# Patient Record
Sex: MALE | Race: WHITE | NOT HISPANIC OR LATINO | URBAN - METROPOLITAN AREA
[De-identification: names, ages, dates, MRNs, and addresses within clinical notes are randomized per-mention and may not be internally consistent; named-entity substitution may affect disease eponyms.]

---

## 2018-07-03 ENCOUNTER — EMERGENCY (EMERGENCY)
Facility: HOSPITAL | Age: 38
LOS: 1 days | End: 2018-07-03
Payer: MEDICAID

## 2018-07-03 PROCEDURE — 73130 X-RAY EXAM OF HAND: CPT | Mod: 26,RT

## 2018-07-03 PROCEDURE — 12002 RPR S/N/AX/GEN/TRNK2.6-7.5CM: CPT

## 2018-07-03 PROCEDURE — 99283 EMERGENCY DEPT VISIT LOW MDM: CPT | Mod: 25

## 2018-12-01 ENCOUNTER — EMERGENCY (EMERGENCY)
Facility: HOSPITAL | Age: 38
LOS: 1 days | End: 2018-12-01
Payer: OTHER GOVERNMENT

## 2018-12-01 PROCEDURE — 99283 EMERGENCY DEPT VISIT LOW MDM: CPT

## 2020-01-31 ENCOUNTER — OUTPATIENT (OUTPATIENT)
Dept: OUTPATIENT SERVICES | Facility: HOSPITAL | Age: 40
LOS: 1 days | End: 2020-01-31
Payer: OTHER GOVERNMENT

## 2020-01-31 PROCEDURE — 72146 MRI CHEST SPINE W/O DYE: CPT | Mod: 26

## 2020-01-31 PROCEDURE — 72148 MRI LUMBAR SPINE W/O DYE: CPT | Mod: 26

## 2022-10-14 ENCOUNTER — APPOINTMENT (OUTPATIENT)
Dept: RADIOLOGY | Facility: CLINIC | Age: 42
End: 2022-10-14

## 2022-10-14 PROCEDURE — 73564 X-RAY EXAM KNEE 4 OR MORE: CPT | Mod: LT

## 2023-06-21 ENCOUNTER — TRANSCRIPTION ENCOUNTER (OUTPATIENT)
Age: 43
End: 2023-06-21

## 2023-06-21 ENCOUNTER — INPATIENT (INPATIENT)
Facility: HOSPITAL | Age: 43
LOS: 1 days | Discharge: HOME | End: 2023-06-23
Attending: SPECIALIST | Admitting: SPECIALIST
Payer: MEDICAID

## 2023-06-21 VITALS
HEART RATE: 81 BPM | TEMPERATURE: 98 F | DIASTOLIC BLOOD PRESSURE: 97 MMHG | RESPIRATION RATE: 18 BRPM | SYSTOLIC BLOOD PRESSURE: 165 MMHG | OXYGEN SATURATION: 95 %

## 2023-06-21 DIAGNOSIS — N48.30 PRIAPISM, UNSPECIFIED: ICD-10-CM

## 2023-06-21 DIAGNOSIS — F11.90 OPIOID USE, UNSPECIFIED, UNCOMPLICATED: ICD-10-CM

## 2023-06-21 DIAGNOSIS — N48.39 OTHER PRIAPISM: ICD-10-CM

## 2023-06-21 PROBLEM — Z00.00 ENCOUNTER FOR PREVENTIVE HEALTH EXAMINATION: Status: ACTIVE | Noted: 2023-06-21

## 2023-06-21 LAB
ANION GAP SERPL CALC-SCNC: 14 MMOL/L — SIGNIFICANT CHANGE UP (ref 7–14)
APTT BLD: 31 SEC — SIGNIFICANT CHANGE UP (ref 27–36.3)
BASOPHILS # BLD AUTO: 0.03 K/UL — SIGNIFICANT CHANGE UP (ref 0–0.2)
BASOPHILS NFR BLD AUTO: 0.3 % — SIGNIFICANT CHANGE UP (ref 0–2)
BLD GP AB SCN SERPL QL: NEGATIVE — SIGNIFICANT CHANGE UP
BUN SERPL-MCNC: 16 MG/DL — SIGNIFICANT CHANGE UP (ref 7–23)
CALCIUM SERPL-MCNC: 8.2 MG/DL — LOW (ref 8.4–10.5)
CHLORIDE SERPL-SCNC: 101 MMOL/L — SIGNIFICANT CHANGE UP (ref 98–107)
CO2 SERPL-SCNC: 24 MMOL/L — SIGNIFICANT CHANGE UP (ref 22–31)
CREAT SERPL-MCNC: 0.82 MG/DL — SIGNIFICANT CHANGE UP (ref 0.5–1.3)
EGFR: 112 ML/MIN/1.73M2 — SIGNIFICANT CHANGE UP
EOSINOPHIL # BLD AUTO: 0.2 K/UL — SIGNIFICANT CHANGE UP (ref 0–0.5)
EOSINOPHIL NFR BLD AUTO: 2.1 % — SIGNIFICANT CHANGE UP (ref 0–6)
GLUCOSE SERPL-MCNC: 117 MG/DL — HIGH (ref 70–99)
HCT VFR BLD CALC: 30.1 % — LOW (ref 39–50)
HGB BLD-MCNC: 10.1 G/DL — LOW (ref 13–17)
IANC: 5.02 K/UL — SIGNIFICANT CHANGE UP (ref 1.8–7.4)
IMM GRANULOCYTES NFR BLD AUTO: 0.3 % — SIGNIFICANT CHANGE UP (ref 0–0.9)
INR BLD: 1.06 RATIO — SIGNIFICANT CHANGE UP (ref 0.88–1.16)
LYMPHOCYTES # BLD AUTO: 3.24 K/UL — SIGNIFICANT CHANGE UP (ref 1–3.3)
LYMPHOCYTES # BLD AUTO: 34.2 % — SIGNIFICANT CHANGE UP (ref 13–44)
MCHC RBC-ENTMCNC: 28.1 PG — SIGNIFICANT CHANGE UP (ref 27–34)
MCHC RBC-ENTMCNC: 33.6 GM/DL — SIGNIFICANT CHANGE UP (ref 32–36)
MCV RBC AUTO: 83.8 FL — SIGNIFICANT CHANGE UP (ref 80–100)
MONOCYTES # BLD AUTO: 0.94 K/UL — HIGH (ref 0–0.9)
MONOCYTES NFR BLD AUTO: 9.9 % — SIGNIFICANT CHANGE UP (ref 2–14)
NEUTROPHILS # BLD AUTO: 5.02 K/UL — SIGNIFICANT CHANGE UP (ref 1.8–7.4)
NEUTROPHILS NFR BLD AUTO: 53.2 % — SIGNIFICANT CHANGE UP (ref 43–77)
NRBC # BLD: 0 /100 WBCS — SIGNIFICANT CHANGE UP (ref 0–0)
NRBC # FLD: 0 K/UL — SIGNIFICANT CHANGE UP (ref 0–0)
PLATELET # BLD AUTO: 218 K/UL — SIGNIFICANT CHANGE UP (ref 150–400)
POTASSIUM SERPL-MCNC: 4 MMOL/L — SIGNIFICANT CHANGE UP (ref 3.5–5.3)
POTASSIUM SERPL-SCNC: 4 MMOL/L — SIGNIFICANT CHANGE UP (ref 3.5–5.3)
PROTHROM AB SERPL-ACNC: 12.3 SEC — SIGNIFICANT CHANGE UP (ref 10.5–13.4)
RBC # BLD: 3.59 M/UL — LOW (ref 4.2–5.8)
RBC # FLD: 12.8 % — SIGNIFICANT CHANGE UP (ref 10.3–14.5)
RH IG SCN BLD-IMP: POSITIVE — SIGNIFICANT CHANGE UP
SODIUM SERPL-SCNC: 139 MMOL/L — SIGNIFICANT CHANGE UP (ref 135–145)
WBC # BLD: 9.46 K/UL — SIGNIFICANT CHANGE UP (ref 3.8–10.5)
WBC # FLD AUTO: 9.46 K/UL — SIGNIFICANT CHANGE UP (ref 3.8–10.5)

## 2023-06-21 PROCEDURE — 93975 VASCULAR STUDY: CPT | Mod: 26

## 2023-06-21 PROCEDURE — 99285 EMERGENCY DEPT VISIT HI MDM: CPT

## 2023-06-21 PROCEDURE — 99222 1ST HOSP IP/OBS MODERATE 55: CPT

## 2023-06-21 PROCEDURE — ZZZZZ: CPT

## 2023-06-21 RX ORDER — HYDROMORPHONE HYDROCHLORIDE 2 MG/ML
0.5 INJECTION INTRAMUSCULAR; INTRAVENOUS; SUBCUTANEOUS ONCE
Refills: 0 | Status: DISCONTINUED | OUTPATIENT
Start: 2023-06-21 | End: 2023-06-21

## 2023-06-21 RX ORDER — CEFAZOLIN SODIUM 1 G
1000 VIAL (EA) INJECTION ONCE
Refills: 0 | Status: COMPLETED | OUTPATIENT
Start: 2023-06-21 | End: 2023-06-21

## 2023-06-21 RX ORDER — HYDROMORPHONE HYDROCHLORIDE 2 MG/ML
1 INJECTION INTRAMUSCULAR; INTRAVENOUS; SUBCUTANEOUS ONCE
Refills: 0 | Status: DISCONTINUED | OUTPATIENT
Start: 2023-06-21 | End: 2023-06-21

## 2023-06-21 RX ORDER — CEFAZOLIN SODIUM 1 G
VIAL (EA) INJECTION
Refills: 0 | Status: DISCONTINUED | OUTPATIENT
Start: 2023-06-21 | End: 2023-06-23

## 2023-06-21 RX ORDER — OXYCODONE HYDROCHLORIDE 5 MG/1
10 TABLET ORAL EVERY 6 HOURS
Refills: 0 | Status: DISCONTINUED | OUTPATIENT
Start: 2023-06-21 | End: 2023-06-21

## 2023-06-21 RX ORDER — SODIUM CHLORIDE 9 MG/ML
1000 INJECTION INTRAMUSCULAR; INTRAVENOUS; SUBCUTANEOUS
Refills: 0 | Status: DISCONTINUED | OUTPATIENT
Start: 2023-06-21 | End: 2023-06-23

## 2023-06-21 RX ORDER — ACETAMINOPHEN 500 MG
975 TABLET ORAL EVERY 6 HOURS
Refills: 0 | Status: DISCONTINUED | OUTPATIENT
Start: 2023-06-21 | End: 2023-06-23

## 2023-06-21 RX ORDER — HYDROMORPHONE HYDROCHLORIDE 2 MG/ML
1 INJECTION INTRAMUSCULAR; INTRAVENOUS; SUBCUTANEOUS EVERY 4 HOURS
Refills: 0 | Status: DISCONTINUED | OUTPATIENT
Start: 2023-06-21 | End: 2023-06-23

## 2023-06-21 RX ORDER — CEFAZOLIN SODIUM 1 G
1000 VIAL (EA) INJECTION EVERY 8 HOURS
Refills: 0 | Status: DISCONTINUED | OUTPATIENT
Start: 2023-06-21 | End: 2023-06-23

## 2023-06-21 RX ORDER — OXYCODONE HYDROCHLORIDE 5 MG/1
5 TABLET ORAL EVERY 4 HOURS
Refills: 0 | Status: DISCONTINUED | OUTPATIENT
Start: 2023-06-21 | End: 2023-06-23

## 2023-06-21 RX ORDER — CEFAZOLIN SODIUM 1 G
1000 VIAL (EA) INJECTION EVERY 8 HOURS
Refills: 0 | Status: DISCONTINUED | OUTPATIENT
Start: 2023-06-21 | End: 2023-06-21

## 2023-06-21 RX ORDER — LIDOCAINE HCL 20 MG/ML
30 VIAL (ML) INJECTION ONCE
Refills: 0 | Status: COMPLETED | OUTPATIENT
Start: 2023-06-21 | End: 2023-06-21

## 2023-06-21 RX ORDER — ACETAMINOPHEN 500 MG
1000 TABLET ORAL ONCE
Refills: 0 | Status: COMPLETED | OUTPATIENT
Start: 2023-06-21 | End: 2023-06-22

## 2023-06-21 RX ADMIN — HYDROMORPHONE HYDROCHLORIDE 1 MILLIGRAM(S): 2 INJECTION INTRAMUSCULAR; INTRAVENOUS; SUBCUTANEOUS at 15:28

## 2023-06-21 RX ADMIN — HYDROMORPHONE HYDROCHLORIDE 0.5 MILLIGRAM(S): 2 INJECTION INTRAMUSCULAR; INTRAVENOUS; SUBCUTANEOUS at 05:05

## 2023-06-21 RX ADMIN — HYDROMORPHONE HYDROCHLORIDE 1 MILLIGRAM(S): 2 INJECTION INTRAMUSCULAR; INTRAVENOUS; SUBCUTANEOUS at 20:28

## 2023-06-21 RX ADMIN — OXYCODONE HYDROCHLORIDE 10 MILLIGRAM(S): 5 TABLET ORAL at 10:20

## 2023-06-21 RX ADMIN — HYDROMORPHONE HYDROCHLORIDE 1 MILLIGRAM(S): 2 INJECTION INTRAMUSCULAR; INTRAVENOUS; SUBCUTANEOUS at 22:46

## 2023-06-21 RX ADMIN — HYDROMORPHONE HYDROCHLORIDE 1 MILLIGRAM(S): 2 INJECTION INTRAMUSCULAR; INTRAVENOUS; SUBCUTANEOUS at 16:32

## 2023-06-21 RX ADMIN — Medication 100 MILLIGRAM(S): at 17:48

## 2023-06-21 RX ADMIN — HYDROMORPHONE HYDROCHLORIDE 0.5 MILLIGRAM(S): 2 INJECTION INTRAMUSCULAR; INTRAVENOUS; SUBCUTANEOUS at 05:30

## 2023-06-21 RX ADMIN — SODIUM CHLORIDE 100 MILLILITER(S): 9 INJECTION INTRAMUSCULAR; INTRAVENOUS; SUBCUTANEOUS at 12:33

## 2023-06-21 RX ADMIN — OXYCODONE HYDROCHLORIDE 10 MILLIGRAM(S): 5 TABLET ORAL at 09:23

## 2023-06-21 RX ADMIN — HYDROMORPHONE HYDROCHLORIDE 0.5 MILLIGRAM(S): 2 INJECTION INTRAMUSCULAR; INTRAVENOUS; SUBCUTANEOUS at 18:38

## 2023-06-21 RX ADMIN — HYDROMORPHONE HYDROCHLORIDE 1 MILLIGRAM(S): 2 INJECTION INTRAMUSCULAR; INTRAVENOUS; SUBCUTANEOUS at 23:05

## 2023-06-21 RX ADMIN — HYDROMORPHONE HYDROCHLORIDE 1 MILLIGRAM(S): 2 INJECTION INTRAMUSCULAR; INTRAVENOUS; SUBCUTANEOUS at 13:00

## 2023-06-21 RX ADMIN — HYDROMORPHONE HYDROCHLORIDE 1 MILLIGRAM(S): 2 INJECTION INTRAMUSCULAR; INTRAVENOUS; SUBCUTANEOUS at 17:01

## 2023-06-21 RX ADMIN — HYDROMORPHONE HYDROCHLORIDE 1 MILLIGRAM(S): 2 INJECTION INTRAMUSCULAR; INTRAVENOUS; SUBCUTANEOUS at 12:33

## 2023-06-21 RX ADMIN — HYDROMORPHONE HYDROCHLORIDE 0.5 MILLIGRAM(S): 2 INJECTION INTRAMUSCULAR; INTRAVENOUS; SUBCUTANEOUS at 18:14

## 2023-06-21 RX ADMIN — HYDROMORPHONE HYDROCHLORIDE 1 MILLIGRAM(S): 2 INJECTION INTRAMUSCULAR; INTRAVENOUS; SUBCUTANEOUS at 18:28

## 2023-06-21 RX ADMIN — HYDROMORPHONE HYDROCHLORIDE 1 MILLIGRAM(S): 2 INJECTION INTRAMUSCULAR; INTRAVENOUS; SUBCUTANEOUS at 20:45

## 2023-06-21 RX ADMIN — HYDROMORPHONE HYDROCHLORIDE 1 MILLIGRAM(S): 2 INJECTION INTRAMUSCULAR; INTRAVENOUS; SUBCUTANEOUS at 16:00

## 2023-06-21 RX ADMIN — HYDROMORPHONE HYDROCHLORIDE 1 MILLIGRAM(S): 2 INJECTION INTRAMUSCULAR; INTRAVENOUS; SUBCUTANEOUS at 18:38

## 2023-06-21 RX ADMIN — Medication 100 MILLIGRAM(S): at 21:27

## 2023-06-21 RX ADMIN — Medication 30 MILLILITER(S): at 18:16

## 2023-06-21 NOTE — CONSULT NOTE ADULT - PROBLEM SELECTOR RECOMMENDATION 9
-management per   -IVF, pain control  -NPO for possible IR consult for Angiogram to confirm high flow priapism and selective arterial embolization

## 2023-06-21 NOTE — CONSULT NOTE ADULT - PROBLEM SELECTOR RECOMMENDATION 2
-pt gets suboxone 12mg from doctor in [place]  -stopped taking it a week ago thinking it may be contributing to his priapism  -no withdrawal symptoms at this time  -c/w oxycodone for pain control  -outpt f/u pain management

## 2023-06-21 NOTE — H&P ADULT - PROBLEM SELECTOR PLAN 1
-Pain management  -NPO/IVF  -Consult IR for angiogram in AM Admit under Dr Winters   -Pain management  - Keep NPO  -IVF  -Consult IR for angiogram in AM

## 2023-06-21 NOTE — ED ADULT NURSE NOTE - OBJECTIVE STATEMENT
Pt complaining of having an erection for 48 hours. PT is A&Ox4 IV placed 18g and labs drawn. Pain meds given.

## 2023-06-21 NOTE — ED ADULT TRIAGE NOTE - CHIEF COMPLAINT QUOTE
Pt arrives from [place] for Urology consult. Had Priapism X 2 days. Total of 6cc drained at [facility]. 18G R ac.

## 2023-06-21 NOTE — ED ADULT NURSE NOTE - CHIEF COMPLAINT QUOTE
Pt arrives from [place] for Urology consult. Had Priapism X 2 days. Total of 6cc drained at [facility]. 18G R ac.

## 2023-06-21 NOTE — H&P ADULT - ASSESSMENT
44 y/o M transferred from [facility] due to priapism x2d. Pt. was drained at previous facility w/ minimal relief. Blood gas results reflective of high-flow state.     42 y/o M transferred from [facility] due to priapism x2d. Pt. was drained at previous facility w/ minimal relief. Blood gas results reflective of high-flow state.

## 2023-06-21 NOTE — PROCEDURE NOTE - GENERAL PROCEDURE DETAILS
14Guage angiocath needle used to irrigate and aspirate the corpora at the head of the penis bilaterally

## 2023-06-21 NOTE — PATIENT PROFILE ADULT - NSTOBACCO QUIT READY_GEN_A_CORE_SD
Discharge instructions explained to patient and spouse. Both verbalized understanding at this time. not motivated to quit

## 2023-06-21 NOTE — ED PROVIDER NOTE - CLINICAL SUMMARY MEDICAL DECISION MAKING FREE TEXT BOX
43-year-old male past medical history of priapism presenting in transfer from outside facility for 2 days of priapism that was not alleviated by traditional interventions.  Thought is that he will require surgical dimension here at [facility].  Urology is consulted emergently to the emergency department for evaluation.  I will treat the patient's pain at this time with narcotic pain medications.  Disposition will be per the urology service

## 2023-06-21 NOTE — PROCEDURE NOTE - ADDITIONAL PROCEDURE DETAILS
Patient discussed risks benefits and agreed to procedure, confirmed by consent.   The patient was prepped and drapped in the usual standard fashion  Patient in supine position  Local 1% lidocaine used 30cc for anesthetic.   14Guage angiocath needle used to irrigate and aspirate the corpora at the head of the penis bilaterally.  Patient's priapism appeared to be softening with angiocath aspiration and drainage.     The angiocaths were removed an hour later.

## 2023-06-21 NOTE — H&P ADULT - NS ATTEND AMEND GEN_ALL_CORE FT
discussed with Urologist at [facility].  initially felt to be venous given history  - initial irrigation was dark but then bright red. ABG consistent with arterial priapism  still has rigidity this am with no pain - plan for IR emobolization knowing risks for ED

## 2023-06-21 NOTE — H&P ADULT - HISTORY OF PRESENT ILLNESS
42 y/o M PMHx of priapism (s/p [place] [facility] [place] and opioid abuse (on suboxone 12mg) transferred from [facility] to [facility] due to prolonged erection x2days. Pt. Admits to increasing pain x1.5days. Pt. States that for approx. 1 month he’s been experiencing recurrent, unstimulated erections that last for approx. 5hrs that spontaneously resolve. Denies a specific trigger prior to each episode. Denies dysuria, urinary retention, pain medications, preceding trauma.      At [facility], drainage procedure was performed with minimal relief. Corporal blood gas results reflect a high-flow state (pH = 7.44, PCO2 = 26.2, PO2 = 185).  42 y/o M PMHx of priapism (s/p shunt procedure at [facility] 2022) and opioid abuse (on suboxone 12mg) transferred from [facility] to [facility] due to prolonged erection x2days that started to be painful.  Pt states that for approx. 1 month,  he’s been experiencing recurrent, unprovoked erections that last for approx. 5hrs that spontaneously resolve. Denies a specific trigger prior to each episode. Denies dysuria, urinary retention, using any pain medications, preceding trauma.    At [facility], drainage procedure was performed with minimal relief. Corporal blood gas results reflect a high-flow state (pH = 7.44, PCO2 = 26.2, PO2 = 185).

## 2023-06-21 NOTE — ED PROVIDER NOTE - OBJECTIVE STATEMENT
43-year-old male with history of previous priapism requiring intervention presenting in transfer from [facility] for priapism.  48 hours ago patient said he started with an erection that has not stopped since.  At the outside facility he received Dilaudid as well as multiple attempts to drain the priapism.  Urology also tried to inject with phenylephrine.  These interventions helped alleviate his symptoms.  The working diagnosis was that the patient had a high flow low flow state secondary to his previous surgery for his previous priapism.  Patient was transferred here for definitive care by the urology service.

## 2023-06-21 NOTE — CONSULT NOTE ADULT - ASSESSMENT
44 y/o M PMHx of chronic back pain, chronic opioid abuse (on suboxone 12mg),  priapism (s/p shunt procedure at [facility] 2022), transferred from [facility] to [facility] due to prolonged priapism

## 2023-06-21 NOTE — PROGRESS NOTE ADULT - ASSESSMENT
This 44 yo M admitted with poss high flow priapism   Plan:  - poss IR today  - NPO  - pain control This 42 yo M transferred from [facility] with poss high flow priapism   Plan:  - poss IR today  - NPO  - pain control

## 2023-06-21 NOTE — CHART NOTE - NSCHARTNOTEFT_GEN_A_CORE
44 y/o M PMHx of chronic back pain, chronic opioid abuse (on suboxone 12mg),  priapism (s/p shunt procedure at [facility] 2022), transferred from [facility] to [facility] due to prolonged priapism.      --IR consulted for pudendal angio  --please obtain penile doppler for further evaluation, can keep pt npo for now pending review of doppler.   --plan discussed w/ urology

## 2023-06-21 NOTE — ED PROVIDER NOTE - PHYSICAL EXAMINATION
Vitals: I have reviewed the patients vital signs  General: nontoxic appearing  HEENT: Atraumatic, normocephalic, airway patent  Eyes: EOMI, tracking appropriately  Neck: no tracheal deviation  Chest/Lungs: no trauma, symmetric chest rise, speaking in complete sentences,  no resp distress  Heart: skin and extremities well perfused, regular rate and rhythm  Neuro: A+Ox3, appears non focal  MSK: no deformities  Skin: no cyanosis, no jaundice   Psych:  Normal mood and affect  : Normal external anatomy.  Phallus is turbid to palpation

## 2023-06-21 NOTE — ED ADULT NURSE NOTE - NSFALLUNIVINTERV_ED_ALL_ED
Bed/Stretcher in lowest position, wheels locked, appropriate side rails in place/Call bell, personal items and telephone in reach/Instruct patient to call for assistance before getting out of bed/chair/stretcher/Non-slip footwear applied when patient is off stretcher/[place] to call system/Physically safe environment - no spills, clutter or unnecessary equipment/Purposeful proactive rounding/Room/bathroom lighting operational, light cord in reach

## 2023-06-22 PROCEDURE — 54430 REVISION OF PENIS: CPT

## 2023-06-22 PROCEDURE — 99222 1ST HOSP IP/OBS MODERATE 55: CPT | Mod: 57

## 2023-06-22 PROCEDURE — 99232 SBSQ HOSP IP/OBS MODERATE 35: CPT

## 2023-06-22 RX ORDER — ACETAMINOPHEN 500 MG
1000 TABLET ORAL ONCE
Refills: 0 | Status: COMPLETED | OUTPATIENT
Start: 2023-06-22 | End: 2023-06-22

## 2023-06-22 RX ORDER — HEPARIN SODIUM 5000 [USP'U]/ML
5000 INJECTION INTRAVENOUS; SUBCUTANEOUS EVERY 8 HOURS
Refills: 0 | Status: DISCONTINUED | OUTPATIENT
Start: 2023-06-22 | End: 2023-06-23

## 2023-06-22 RX ORDER — FENTANYL CITRATE 50 UG/ML
25 INJECTION INTRAVENOUS
Refills: 0 | Status: DISCONTINUED | OUTPATIENT
Start: 2023-06-22 | End: 2023-06-23

## 2023-06-22 RX ORDER — ONDANSETRON 8 MG/1
4 TABLET, FILM COATED ORAL ONCE
Refills: 0 | Status: DISCONTINUED | OUTPATIENT
Start: 2023-06-22 | End: 2023-06-23

## 2023-06-22 RX ORDER — KETOROLAC TROMETHAMINE 30 MG/ML
15 SYRINGE (ML) INJECTION ONCE
Refills: 0 | Status: DISCONTINUED | OUTPATIENT
Start: 2023-06-22 | End: 2023-06-22

## 2023-06-22 RX ADMIN — Medication 100 MILLIGRAM(S): at 21:30

## 2023-06-22 RX ADMIN — Medication 15 MILLIGRAM(S): at 13:41

## 2023-06-22 RX ADMIN — HYDROMORPHONE HYDROCHLORIDE 1 MILLIGRAM(S): 2 INJECTION INTRAMUSCULAR; INTRAVENOUS; SUBCUTANEOUS at 05:54

## 2023-06-22 RX ADMIN — HYDROMORPHONE HYDROCHLORIDE 1 MILLIGRAM(S): 2 INJECTION INTRAMUSCULAR; INTRAVENOUS; SUBCUTANEOUS at 19:15

## 2023-06-22 RX ADMIN — HYDROMORPHONE HYDROCHLORIDE 1 MILLIGRAM(S): 2 INJECTION INTRAMUSCULAR; INTRAVENOUS; SUBCUTANEOUS at 06:10

## 2023-06-22 RX ADMIN — Medication 100 MILLIGRAM(S): at 05:54

## 2023-06-22 RX ADMIN — Medication 100 MILLIGRAM(S): at 14:03

## 2023-06-22 RX ADMIN — Medication 1000 MILLIGRAM(S): at 10:30

## 2023-06-22 RX ADMIN — HYDROMORPHONE HYDROCHLORIDE 1 MILLIGRAM(S): 2 INJECTION INTRAMUSCULAR; INTRAVENOUS; SUBCUTANEOUS at 10:30

## 2023-06-22 RX ADMIN — Medication 15 MILLIGRAM(S): at 14:10

## 2023-06-22 RX ADMIN — Medication 400 MILLIGRAM(S): at 17:36

## 2023-06-22 RX ADMIN — Medication 400 MILLIGRAM(S): at 10:04

## 2023-06-22 RX ADMIN — Medication 1000 MILLIGRAM(S): at 18:02

## 2023-06-22 RX ADMIN — HYDROMORPHONE HYDROCHLORIDE 1 MILLIGRAM(S): 2 INJECTION INTRAMUSCULAR; INTRAVENOUS; SUBCUTANEOUS at 19:35

## 2023-06-22 RX ADMIN — HYDROMORPHONE HYDROCHLORIDE 1 MILLIGRAM(S): 2 INJECTION INTRAMUSCULAR; INTRAVENOUS; SUBCUTANEOUS at 10:04

## 2023-06-22 RX ADMIN — HYDROMORPHONE HYDROCHLORIDE 1 MILLIGRAM(S): 2 INJECTION INTRAMUSCULAR; INTRAVENOUS; SUBCUTANEOUS at 15:30

## 2023-06-22 RX ADMIN — HYDROMORPHONE HYDROCHLORIDE 1 MILLIGRAM(S): 2 INJECTION INTRAMUSCULAR; INTRAVENOUS; SUBCUTANEOUS at 15:03

## 2023-06-22 RX ADMIN — SODIUM CHLORIDE 100 MILLILITER(S): 9 INJECTION INTRAMUSCULAR; INTRAVENOUS; SUBCUTANEOUS at 23:45

## 2023-06-22 NOTE — PROGRESS NOTE ADULT - ASSESSMENT
44 y/o M PMHx of chronic back pain, chronic opioid abuse (on suboxone 12mg),  priapism (s/p shunt procedure at [facility] 2022), transferred from [facility] to [facility] due to prolonged priapism

## 2023-06-22 NOTE — PROGRESS NOTE ADULT - PROBLEM SELECTOR PLAN 1
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 High resistance low velocity waveform in the proximal portion of the right cavernosal artery, consistent with veno-occlusive/low flow priapism.  - plan for penile shunt today  - NPO, IVF, pain control

## 2023-06-22 NOTE — CHART NOTE - NSCHARTNOTEFT_GEN_A_CORE
42 y/o M PMHx of chronic back pain, chronic opioid abuse (on suboxone 12mg),  priapism [place] [facility] [place] transferred from [facility] to [facility] due to prolonged priapism.      --IR consulted for pudendal angio  -- Penile doppler shows high resistance w/ low flow.  -- planned for OR per urology for penile shunt  -- IR to sign off. Reconsult prn

## 2023-06-22 NOTE — PROGRESS NOTE ADULT - ASSESSMENT
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 High resistance low velocity waveform in the proximal portion of the right cavernosal artery, consistent with veno-occlusive/low flow priapism.    6/22: still fully erect and painful    Plan:  -NPO  -OR for penile shunt  -IVF  -continue ancef  -consent to be signed  -consider chronic pain consult  -f/u medicine  -DVT prophy, IS, OOB, ambulate

## 2023-06-23 ENCOUNTER — TRANSCRIPTION ENCOUNTER (OUTPATIENT)
Age: 43
End: 2023-06-23

## 2023-06-23 VITALS
RESPIRATION RATE: 18 BRPM | DIASTOLIC BLOOD PRESSURE: 82 MMHG | TEMPERATURE: 98 F | SYSTOLIC BLOOD PRESSURE: 133 MMHG | OXYGEN SATURATION: 99 % | HEART RATE: 100 BPM

## 2023-06-23 LAB
ANION GAP SERPL CALC-SCNC: 8 MMOL/L — SIGNIFICANT CHANGE UP (ref 7–14)
BUN SERPL-MCNC: 11 MG/DL — SIGNIFICANT CHANGE UP (ref 7–23)
CALCIUM SERPL-MCNC: 8.3 MG/DL — LOW (ref 8.4–10.5)
CHLORIDE SERPL-SCNC: 104 MMOL/L — SIGNIFICANT CHANGE UP (ref 98–107)
CO2 SERPL-SCNC: 23 MMOL/L — SIGNIFICANT CHANGE UP (ref 22–31)
CREAT SERPL-MCNC: 0.76 MG/DL — SIGNIFICANT CHANGE UP (ref 0.5–1.3)
EGFR: 114 ML/MIN/1.73M2 — SIGNIFICANT CHANGE UP
GLUCOSE SERPL-MCNC: 147 MG/DL — HIGH (ref 70–99)
HCT VFR BLD CALC: 32.8 % — LOW (ref 39–50)
HGB BLD-MCNC: 10.7 G/DL — LOW (ref 13–17)
MCHC RBC-ENTMCNC: 27.3 PG — SIGNIFICANT CHANGE UP (ref 27–34)
MCHC RBC-ENTMCNC: 32.6 GM/DL — SIGNIFICANT CHANGE UP (ref 32–36)
MCV RBC AUTO: 83.7 FL — SIGNIFICANT CHANGE UP (ref 80–100)
NRBC # BLD: 0 /100 WBCS — SIGNIFICANT CHANGE UP (ref 0–0)
NRBC # FLD: 0 K/UL — SIGNIFICANT CHANGE UP (ref 0–0)
PLATELET # BLD AUTO: 249 K/UL — SIGNIFICANT CHANGE UP (ref 150–400)
POTASSIUM SERPL-MCNC: 4.4 MMOL/L — SIGNIFICANT CHANGE UP (ref 3.5–5.3)
POTASSIUM SERPL-SCNC: 4.4 MMOL/L — SIGNIFICANT CHANGE UP (ref 3.5–5.3)
RBC # BLD: 3.92 M/UL — LOW (ref 4.2–5.8)
RBC # FLD: 12.9 % — SIGNIFICANT CHANGE UP (ref 10.3–14.5)
SODIUM SERPL-SCNC: 135 MMOL/L — SIGNIFICANT CHANGE UP (ref 135–145)
WBC # BLD: 20.02 K/UL — HIGH (ref 3.8–10.5)
WBC # FLD AUTO: 20.02 K/UL — HIGH (ref 3.8–10.5)

## 2023-06-23 PROCEDURE — 99232 SBSQ HOSP IP/OBS MODERATE 35: CPT

## 2023-06-23 RX ORDER — NALOXONE HYDROCHLORIDE 4 MG/.1ML
4 SPRAY NASAL
Qty: 1 | Refills: 0
Start: 2023-06-23

## 2023-06-23 RX ORDER — BUPRENORPHINE AND NALOXONE 2; .5 MG/1; MG/1
1 TABLET SUBLINGUAL
Qty: 0 | Refills: 0 | DISCHARGE

## 2023-06-23 RX ORDER — ACETAMINOPHEN 500 MG
3 TABLET ORAL
Qty: 0 | Refills: 0 | DISCHARGE
Start: 2023-06-23

## 2023-06-23 RX ORDER — OXYCODONE HYDROCHLORIDE 5 MG/1
10 TABLET ORAL EVERY 4 HOURS
Refills: 0 | Status: DISCONTINUED | OUTPATIENT
Start: 2023-06-23 | End: 2023-06-23

## 2023-06-23 RX ORDER — OXYCODONE HYDROCHLORIDE 5 MG/1
1 TABLET ORAL
Qty: 6 | Refills: 0
Start: 2023-06-23

## 2023-06-23 RX ADMIN — HYDROMORPHONE HYDROCHLORIDE 1 MILLIGRAM(S): 2 INJECTION INTRAMUSCULAR; INTRAVENOUS; SUBCUTANEOUS at 02:25

## 2023-06-23 RX ADMIN — FENTANYL CITRATE 25 MICROGRAM(S): 50 INJECTION INTRAVENOUS at 00:03

## 2023-06-23 RX ADMIN — HYDROMORPHONE HYDROCHLORIDE 1 MILLIGRAM(S): 2 INJECTION INTRAMUSCULAR; INTRAVENOUS; SUBCUTANEOUS at 06:14

## 2023-06-23 RX ADMIN — FENTANYL CITRATE 25 MICROGRAM(S): 50 INJECTION INTRAVENOUS at 00:20

## 2023-06-23 RX ADMIN — Medication 975 MILLIGRAM(S): at 00:50

## 2023-06-23 RX ADMIN — OXYCODONE HYDROCHLORIDE 10 MILLIGRAM(S): 5 TABLET ORAL at 10:31

## 2023-06-23 RX ADMIN — HEPARIN SODIUM 5000 UNIT(S): 5000 INJECTION INTRAVENOUS; SUBCUTANEOUS at 05:20

## 2023-06-23 RX ADMIN — HYDROMORPHONE HYDROCHLORIDE 1 MILLIGRAM(S): 2 INJECTION INTRAMUSCULAR; INTRAVENOUS; SUBCUTANEOUS at 02:07

## 2023-06-23 RX ADMIN — OXYCODONE HYDROCHLORIDE 10 MILLIGRAM(S): 5 TABLET ORAL at 11:06

## 2023-06-23 RX ADMIN — SODIUM CHLORIDE 100 MILLILITER(S): 9 INJECTION INTRAMUSCULAR; INTRAVENOUS; SUBCUTANEOUS at 02:08

## 2023-06-23 RX ADMIN — Medication 100 MILLIGRAM(S): at 13:08

## 2023-06-23 RX ADMIN — Medication 975 MILLIGRAM(S): at 00:27

## 2023-06-23 RX ADMIN — HYDROMORPHONE HYDROCHLORIDE 1 MILLIGRAM(S): 2 INJECTION INTRAMUSCULAR; INTRAVENOUS; SUBCUTANEOUS at 06:30

## 2023-06-23 RX ADMIN — HEPARIN SODIUM 5000 UNIT(S): 5000 INJECTION INTRAVENOUS; SUBCUTANEOUS at 13:08

## 2023-06-23 RX ADMIN — Medication 100 MILLIGRAM(S): at 05:18

## 2023-06-23 NOTE — DISCHARGE NOTE NURSING/CASE MANAGEMENT/SOCIAL WORK - NSDCPNINST_GEN_ALL_CORE
Maintain incision clean and dry, call MD with any signs of infection such as fever, redness or drainage from site. Avoid strenuous activity and constipation which may be a side effect from taking narcotic medication. follow-up with your surgeon as well as  PMD as instructed for continuity of care.

## 2023-06-23 NOTE — PROGRESS NOTE ADULT - REASON FOR ADMISSION
High-flow Priapism

## 2023-06-23 NOTE — DISCHARGE NOTE PROVIDER - CARE PROVIDER_API CALL
Rik Rhodes  Urology  [address] [place] [id number]  Phone: [phone]  Fax: [phone]  Follow Up Time: 2 weeks  
intact

## 2023-06-23 NOTE — DISCHARGE NOTE NURSING/CASE MANAGEMENT/SOCIAL WORK - PATIENT PORTAL LINK FT
You can access the FollowMyHealth Patient Portal offered by [facility] by registering at the following website: [email or web address]. By joining [employer]’s FollowMyHealth portal, you will also be able to view your health information using other applications (apps) compatible with our system.

## 2023-06-23 NOTE — CONSULT NOTE ADULT - SUBJECTIVE AND OBJECTIVE BOX
Patient is a 43y old  Male who presents with a chief complaint of High-flow Priapism (23 Jun 2023 11:32), now S/P Penal decompression/Corporal Dilation.      HPI:  42 y/o M PMHx of priapism (s/p shunt procedure at [facility] 2022) and opioid abuse (on suboxone 12mg) transferred from [facility] to [facility] due to prolonged erection x2days that started to be painful.  Pt states that for approx. 1 month,  he’s been experiencing recurrent, unprovoked erections that last for approx. 5hrs that spontaneously resolve. Denies a specific trigger prior to each episode. Denies dysuria, urinary retention, using any pain medications, preceding trauma.    At [facility], drainage procedure was performed with minimal relief. Corporal blood gas results reflect a high-flow state (pH = 7.44, PCO2 = 26.2, PO2 = 185).  (21 Jun 2023 02:45)      PAST MEDICAL & SURGICAL HISTORY:  Priapism          MEDICATIONS  (STANDING):  ceFAZolin   IVPB 1000 milliGRAM(s) IV Intermittent every 8 hours  ceFAZolin   IVPB      heparin   Injectable 5000 Unit(s) SubCutaneous every 8 hours    MEDICATIONS  (PRN):  acetaminophen     Tablet .. 975 milliGRAM(s) Oral every 6 hours PRN Mild Pain (1 - 3)  oxyCODONE    IR 5 milliGRAM(s) Oral every 4 hours PRN Moderate Pain (4 - 6)  oxyCODONE    IR 10 milliGRAM(s) Oral every 4 hours PRN Severe Pain (7 - 10)      ICU Vital Signs Last 24 Hrs  T(C): 36.8 (23 Jun 2023 11:11), Max: 37.9 (22 Jun 2023 23:25)  T(F): 98.3 (23 Jun 2023 11:11), Max: 100.3 (22 Jun 2023 23:25)  HR: 100 (23 Jun 2023 11:11) (86 - 115)  BP: 133/82 (23 Jun 2023 11:11) (126/75 - 173/98)  BP(mean): 103 (23 Jun 2023 01:00) (84 - 115)  ABP: --  ABP(mean): --  RR: 18 (23 Jun 2023 11:11) (14 - 24)  SpO2: 99% (23 Jun 2023 11:11) (93% - 100%)    O2 Parameters below as of 23 Jun 2023 11:11  Patient On (Oxygen Delivery Method): room air            Vital Signs Last 24 Hrs  T(C): 36.8 (23 Jun 2023 11:11), Max: 37.9 (22 Jun 2023 23:25)  T(F): 98.3 (23 Jun 2023 11:11), Max: 100.3 (22 Jun 2023 23:25)  HR: 100 (23 Jun 2023 11:11) (86 - 115)  BP: 133/82 (23 Jun 2023 11:11) (126/75 - 173/98)  BP(mean): 103 (23 Jun 2023 01:00) (84 - 115)  RR: 18 (23 Jun 2023 11:11) (14 - 24)  SpO2: 99% (23 Jun 2023 11:11) (93% - 100%)    Parameters below as of 23 Jun 2023 11:11  Patient On (Oxygen Delivery Method): room air                              10.7   20.02 )-----------( 249      ( 23 Jun 2023 06:47 )             32.8       SUMMARY:  Patient sitting up in bed, complaining of pain on his penis. Patient states the pain is sharp, pressure, throbbing and burning. Patient reports the IV Dilaudid was helping him for pain initially but now it only last for 1o minutes. Patient states he tried PO Oxycodone this morning and believes that the PO Oxycodone will work better for his pain. Patient reports taking Suboxone 12mg BID at home. Patient also states that the Suboxone was started at an alcohol and drug addiction rehab facility however states the he takes Suboxone for his chronic back and knee pain. Patient reports some back pain currently. Reports last cocaine and alcohol use was 10 months ago. Patient alert and orientedx4. Maintains eye contact, not in any apparent distress. Denies anxiety and depression.  RECOMMENDATIONS:  1) Recommend changing current orders for PO Acetaminophen to PO Acetaminophen 650mg Q6H standing X 2 days then PRN for pain. Not to be given within 6 hours of last dose of Acetaminophen.  2) Recommend PO Gabapentin 300mg Q8H for pain. Hold for over sedation.   3) Recommend continuing current orders for PO Oxycodone. Hold for over sedation. Not to be given within one hour of any other immediate acting opioid.  4) Recommend consulting Addiction Psychiatry for inpatient recommendation on Suboxone.  5) Recommend follow up with outpatient Suboxone prescribed upon discharge from the hospital.  I STOP Reviewed and found:  Buprenorphine-naloxone 12-3mg sl film, quantity of 60 films for 30 days. Last dispensed on 06/08/23.  Pain service to sign off. Please call acute pain service if further assistance needed with pain ,management.                 
Charity Pinedo MD  Pager [phone]    HPI:  42 y/o M PMHx of chronic back pain, chronic opioid abuse (on suboxone 12mg),  priapism (s/p shunt procedure at [facility] 20[place] transferred from [facility] to [facility] due to prolonged painful erection x2days.  Pt states that for approx. 1 month,  he’s been experiencing recurrent, unprovoked erections that last for approx. 5hrs that spontaneously resolve. Denies a specific trigger prior to each episode. Denies dysuria, urinary retention, using any pain medications, preceding trauma.    At [facility], drainage procedure was performed with minimal relief. Corporal blood gas results reflect a high-flow state (pH = 7.44, PCO2 = 26.2, PO2 = 185).     Pt reports that he thought suboxone was contributing to his priapism so he stopped taking it about a week ago. He gets suboxone from a provider in [place]. He states he takes suboxone for diffuse body pains in his joints , including back/knee/ankle,  but denies any specific pathology such as spinal stenosis/herniated disc/severe arthritis. He currently denies any withdrawal symptoms.       PAST MEDICAL & SURGICAL HISTORY:  Priapism          Review of Systems:   CONSTITUTIONAL: No fever, weight loss, or fatigue  EYES: No eye pain, visual disturbances, or discharge  ENMT:  No difficulty hearing, tinnitus, vertigo; No sinus or throat pain  NECK: No pain or stiffness  BREASTS: No pain, masses, or nipple discharge  RESPIRATORY: No cough, wheezing, chills or hemoptysis; No shortness of breath  CARDIOVASCULAR: No chest pain, palpitations, dizziness, or leg swelling  GASTROINTESTINAL: No abdominal or epigastric pain. No nausea, vomiting, or hematemesis; No diarrhea or constipation. No melena or hematochezia.  GENITOURINARY: No dysuria, frequency, hematuria, or incontinence, +priapism  NEUROLOGICAL: No headaches, memory loss, loss of strength, numbness, or tremors  SKIN: No itching, burning, rashes, or lesions   LYMPH NODES: No enlarged glands  ENDOCRINE: No heat or cold intolerance; No hair loss  MUSCULOSKELETAL: No joint pain or swelling; chronic back pain and knee pain  PSYCHIATRIC: No depression, anxiety, mood swings, or difficulty sleeping  HEME/LYMPH: No easy bruising, or bleeding gums  ALLERY AND IMMUNOLOGIC: No hives or eczema    Allergies    No Known Allergies    Intolerances        Social History:  cigarette smoker, denies alcohol abuse, heroin use in the past    FAMILY HISTORY: noncontributory for premature heart disease in first degree relatives       Home Medications:  suboxone 12mg daily      MEDICATIONS  (STANDING):  sodium chloride 0.9%. 1000 milliLiter(s) (100 mL/Hr) IV Continuous <Continuous>    MEDICATIONS  (PRN):  acetaminophen     Tablet .. 975 milliGRAM(s) Oral every 6 hours PRN Mild Pain (1 - 3)  oxyCODONE    IR 10 milliGRAM(s) Oral every 6 hours PRN Severe Pain (7 - 10)  oxyCODONE    IR 5 milliGRAM(s) Oral every 4 hours PRN Moderate Pain (4 - 6)      Vital Signs Last 24 Hrs  T(C): 36.9 (21 Jun 2023 09:25), Max: 36.9 (21 Jun 2023 09:25)  T(F): 98.4 (21 Jun 2023 09:25), Max: 98.4 (21 Jun 2023 09:25)  HR: 78 (21 Jun 2023 09:25) (71 - 81)  BP: 122/82 (21 Jun 2023 09:25) (122/82 - 165/97)  BP(mean): --  RR: 18 (21 Jun 2023 09:25) (17 - 18)  SpO2: 100% (21 Jun 2023 09:25) (95% - 100%)    Parameters below as of 21 Jun 2023 09:25  Patient On (Oxygen Delivery Method): room air      CAPILLARY BLOOD GLUCOSE        I&O's Summary    20 Jun 2023 07:01  -  21 Jun 2023 07:00  --------------------------------------------------------  IN: 0 mL / OUT: 350 mL / NET: -350 mL        PHYSICAL EXAM:  GENERAL: NAD, well-developed  HEAD:  Atraumatic, Normocephalic  EYES: EOMI,  conjunctiva and sclera clear  NECK: Supple, No JVD  CHEST/LUNG: Clear to auscultation bilaterally; No wheeze  HEART: Regular rate and rhythm; No murmurs, rubs, or gallops  ABDOMEN: Soft, Nontender, Nondistended; Bowel sounds present  : priapism  EXTREMITIES:  2+ Peripheral Pulses, No clubbing, cyanosis, or edema  PSYCH: AAOx3  NEUROLOGY: non-focal  SKIN: No rashes or lesions    LABS:                        10.1   9.46  )-----------( 218      ( 21 Jun 2023 03:05 )             30.1     06-21    139  |  101  |  16  ----------------------------<  117<H>  4.0   |  24  |  0.82    Ca    8.2<L>      21 Jun 2023 03:05      PT/INR - ( 21 Jun 2023 03:05 )   PT: 12.3 sec;   INR: 1.06 ratio         PTT - ( 21 Jun 2023 03:05 )  PTT:31.0 sec      Urinalysis Basic - ( 21 Jun 2023 03:05 )    Color: x / Appearance: x / SG: x / pH: x  Gluc: 117 mg/dL / Ketone: x  / Bili: x / Urobili: x   Blood: x / Protein: x / Nitrite: x   Leuk Esterase: x / RBC: x / WBC x   Sq Epi: x / Non Sq Epi: x / Bacteria: x      Microbiology     RADIOLOGY & ADDITIONAL TESTS:    Imaging Personally Reviewed:      Consultant(s) Notes Reviewed:      Care Discussed with Consultants/Other Providers: urology KELIN Torres, possible angio

## 2023-06-23 NOTE — PROGRESS NOTE ADULT - PROBLEM SELECTOR PLAN 2
-pt gets suboxone 12mg from doctor in [place]  -stopped taking it a week ago thinking it may be contributing to his priapism  - cont to hold Suboxone, no withdrawal symptoms at this time  -c/w oxycodone/dilaudid prn for pain control  -consider chronic pain consult
-pt gets suboxone 12mg from a doctor in [place]  -stopped taking it a week ago thinking it may be contributing to his priapism  - cont to hold Suboxone, no withdrawal symptoms at this time  -c/w oxycodone prn for pain control  - pain service consult

## 2023-06-23 NOTE — DISCHARGE NOTE PROVIDER - NSDCMRMEDTOKEN_GEN_ALL_CORE_FT
amoxicillin-clavulanate 875 mg-125 mg oral tablet: 1 tab(s) orally every 12 hours   acetaminophen 325 mg oral tablet: 3 tab(s) orally every 6 hours as needed for mild to moderate pain  amoxicillin-clavulanate 875 mg-125 mg oral tablet: 1 tab(s) orally every 12 hours  naloxone 4 mg/0.1 mL nasal spray: 4 milligram(s) intranasally once a day if patient is unconscious, call 911, repeat if needed  oxyCODONE 10 mg oral tablet: 1 tab(s) orally every 6 hours as needed for severe pain MDD: 4   acetaminophen 325 mg oral tablet: 3 tab(s) orally every 6 hours as needed for mild to moderate pain  amoxicillin-clavulanate 875 mg-125 mg oral tablet: 1 tab(s) orally every 12 hours  buprenorphine-naloxone 12 mg-3 mg sublingual film: 1 sublingually 2 times a day  naloxone 4 mg/0.1 mL nasal spray: 4 milligram(s) intranasally once a day if patient is unconscious, call 911, repeat if needed  oxyCODONE 10 mg oral tablet: 1 tab(s) orally every 6 hours as needed for severe pain MDD: 4

## 2023-06-23 NOTE — PROGRESS NOTE ADULT - SUBJECTIVE AND OBJECTIVE BOX
Overnight events:  None    Subjective:  Pt offers no complaints    Objective:    Vital signs  T(C): , Max: 37.9 (06-22-23 @ 23:25)  HR: 87 (06-23-23 @ 05:29)  BP: 159/86 (06-23-23 @ 05:29)  SpO2: 95% (06-23-23 @ 05:29)  Wt(kg): --    Output   Griggs: 1310  HILTON: 10  06-22 @ 07:01  -  06-23 @ 07:00  --------------------------------------------------------  IN: 580 mL / OUT: 1720 mL / NET: -1140 mL        Gen: NAD  Abd: soft, nontender  : still erect, glans soft, resolving ecchymoses, HILTON removed    Labs                        10.7   20.02 )-----------( 249      ( 23 Jun 2023 06:47 )             32.8     23 Jun 2023 06:47    135    |  104    |  11     ----------------------------<  147    4.4     |  23     |  0.76     Ca    8.3        23 Jun 2023 06:47        
Overnight events:  None    Subjective:  Pt still c/o penile pain and erection    Objective:    Vital signs  T(C): , Max: 37.5 (06-22-23 @ 01:58)  HR: 88 (06-22-23 @ 05:54)  BP: 160/88 (06-22-23 @ 05:54)  SpO2: 97% (06-22-23 @ 05:54)  Wt(kg): --    Output   Void: NR  06-21 @ 07:01  -  06-22 @ 07:00  --------------------------------------------------------  IN: 0 mL / OUT: 600 mL / NET: -600 mL        Gen: NAD  Abd: soft, nontender  : fully erect, glans soft, + ecchymoses    Labs: none today                        10.1   9.46  )-----------( 218      ( 21 Jun 2023 03:05 )             30.1     21 Jun 2023 03:05    139    |  101    |  16     ----------------------------<  117    4.0     |  24     |  0.82     Ca    8.2        21 Jun 2023 03:05        
 Note    Post op Check    s/p Penile decompression, corporal dilation, distal shunt.  EBL 300ml.      Patient seen and examined without complaints, reports pain is better controlled    Vital Signs Last 24 Hrs  T(C): 37.7 (23 Jun 2023 01:00), Max: 37.9 (22 Jun 2023 23:25)  T(F): 99.9 (23 Jun 2023 01:00), Max: 100.3 (22 Jun 2023 23:25)  HR: 99 (23 Jun 2023 01:30) (81 - 115)  BP: 147/85 (23 Jun 2023 01:30) (126/75 - 173/98)  BP(mean): 103 (23 Jun 2023 01:00) (84 - 115)  RR: 18 (23 Jun 2023 01:30) (14 - 24)  SpO2: 97% (23 Jun 2023 01:30) (93% - 100%)    Parameters below as of 22 Jun 2023 23:25  Patient On (Oxygen Delivery Method): mask, simple face  O2 Flow (L/min): 6    I&O's Summary    21 Jun 2023 07:01  -  22 Jun 2023 07:00  --------------------------------------------------------  IN: 0 mL / OUT: 600 mL / NET: -600 mL    22 Jun 2023 07:01  -  23 Jun 2023 01:49  --------------------------------------------------------  IN: 200 mL / OUT: 715 mL / NET: -515 mL    PHYSICAL EXAM:   Constitutional: well appearing, A+O, no distress    Respiratory: clear     Cardiovascular: regular     Gastrointestinal: soft, nontender     Genitourinary: penile/scrotal support/ dressing with minimal drainage.  HILTON with minimal output, serosanguinous.  Griggs in place, draining well, clear yellow urine.     Extremities: venodynes in place.     
Dr. Charity Pinedo  Pager [phone]    PROGRESS NOTE:     Patient is a 43y old  Male who presents with a chief complaint of High-flow Priapism (23 Jun 2023 09:23)      SUBJECTIVE / OVERNIGHT EVENTS: pt reports less penile pain today   ADDITIONAL REVIEW OF SYSTEMS:  afebrile     MEDICATIONS  (STANDING):  ceFAZolin   IVPB 1000 milliGRAM(s) IV Intermittent every 8 hours  ceFAZolin   IVPB      heparin   Injectable 5000 Unit(s) SubCutaneous every 8 hours    MEDICATIONS  (PRN):  acetaminophen     Tablet .. 975 milliGRAM(s) Oral every 6 hours PRN Mild Pain (1 - 3)  oxyCODONE    IR 5 milliGRAM(s) Oral every 4 hours PRN Moderate Pain (4 - 6)  oxyCODONE    IR 10 milliGRAM(s) Oral every 4 hours PRN Severe Pain (7 - 10)      CAPILLARY BLOOD GLUCOSE        I&O's Summary    22 Jun 2023 07:01  -  23 Jun 2023 07:00  --------------------------------------------------------  IN: 580 mL / OUT: 1720 mL / NET: -1140 mL        PHYSICAL EXAM:  Vital Signs Last 24 Hrs  T(C): 36.8 (23 Jun 2023 11:11), Max: 37.9 (22 Jun 2023 23:25)  T(F): 98.3 (23 Jun 2023 11:11), Max: 100.3 (22 Jun 2023 23:25)  HR: 100 (23 Jun 2023 11:11) (86 - 115)  BP: 133/82 (23 Jun 2023 11:11) (126/75 - 173/98)  BP(mean): 103 (23 Jun 2023 01:00) (84 - 115)  RR: 18 (23 Jun 2023 11:11) (14 - 24)  SpO2: 99% (23 Jun 2023 11:11) (93% - 100%)    Parameters below as of 23 Jun 2023 11:11  Patient On (Oxygen Delivery Method): room air      GENERAL: NAD, well-developed  HEAD:  Atraumatic, Normocephalic  EYES: EOMI,  conjunctiva and sclera clear  NECK: Supple, No JVD  CHEST/LUNG: Clear to auscultation bilaterally; No wheeze  HEART: Regular rate and rhythm; No murmurs, rubs, or gallops  ABDOMEN: Soft, Nontender, Nondistended; Bowel sounds present  : penis erect, softer today  EXTREMITIES:  2+ Peripheral Pulses, No clubbing, cyanosis, or edema  PSYCH: AAOx3  NEUROLOGY: non-focal  SKIN: No rashes or lesions    LABS:                        10.7   20.02 )-----------( 249      ( 23 Jun 2023 06:47 )             32.8     06-23    135  |  104  |  11  ----------------------------<  147<H>  4.4   |  23  |  0.76    Ca    8.3<L>      23 Jun 2023 06:47            Urinalysis Basic - ( 23 Jun 2023 06:47 )    Color: x / Appearance: x / SG: x / pH: x  Gluc: 147 mg/dL / Ketone: x  / Bili: x / Urobili: x   Blood: x / Protein: x / Nitrite: x   Leuk Esterase: x / RBC: x / WBC x   Sq Epi: x / Non Sq Epi: x / Bacteria: x          RADIOLOGY & ADDITIONAL TESTS:  Results Reviewed:   Imaging Personally Reviewed:  Electrocardiogram Personally Reviewed:    COORDINATION OF CARE:  Care Discussed with Consultants/Other Providers [Y/N]: CHUCK Allen, pain consult today  Prior or Outpatient Records Reviewed [Y/N]:  
No new events  Afeb 124/86 71 98%RA    Pt has penile pain as before; erection not down  Abd- soft  Voiding
Dr. Charity Pinedo  Pager [phone]    PROGRESS NOTE:     Patient is a 43y old  Male who presents with a chief complaint of High-flow Priapism (22 Jun 2023 07:40)      SUBJECTIVE / OVERNIGHT EVENTS: denies chest pain or sob , still painful erection  ADDITIONAL REVIEW OF SYSTEMS: afebrile     MEDICATIONS  (STANDING):  ceFAZolin   IVPB 1000 milliGRAM(s) IV Intermittent every 8 hours  ceFAZolin   IVPB      sodium chloride 0.9%. 1000 milliLiter(s) (100 mL/Hr) IV Continuous <Continuous>    MEDICATIONS  (PRN):  acetaminophen     Tablet .. 975 milliGRAM(s) Oral every 6 hours PRN Mild Pain (1 - 3)  HYDROmorphone  Injectable 1 milliGRAM(s) IV Push every 4 hours PRN Severe Pain (7 - 10)  oxyCODONE    IR 5 milliGRAM(s) Oral every 4 hours PRN Moderate Pain (4 - 6)      CAPILLARY BLOOD GLUCOSE        I&O's Summary    21 Jun 2023 07:01  -  22 Jun 2023 07:00  --------------------------------------------------------  IN: 0 mL / OUT: 600 mL / NET: -600 mL    22 Jun 2023 07:01  -  22 Jun 2023 11:38  --------------------------------------------------------  IN: 0 mL / OUT: 400 mL / NET: -400 mL        PHYSICAL EXAM:  Vital Signs Last 24 Hrs  T(C): 36.8 (22 Jun 2023 09:35), Max: 37.5 (22 Jun 2023 01:58)  T(F): 98.2 (22 Jun 2023 09:35), Max: 99.5 (22 Jun 2023 01:58)  HR: 81 (22 Jun 2023 09:35) (71 - 103)  BP: 162/85 (22 Jun 2023 09:35) (159/86 - 174/90)  BP(mean): --  RR: 18 (22 Jun 2023 09:35) (16 - 18)  SpO2: 98% (22 Jun 2023 09:35) (94% - 100%)    Parameters below as of 22 Jun 2023 09:35  Patient On (Oxygen Delivery Method): room air    GENERAL: NAD, well-developed  HEAD:  Atraumatic, Normocephalic  EYES: EOMI,  conjunctiva and sclera clear  NECK: Supple, No JVD  CHEST/LUNG: Clear to auscultation bilaterally; No wheeze  HEART: Regular rate and rhythm; No murmurs, rubs, or gallops  ABDOMEN: Soft, Nontender, Nondistended; Bowel sounds present  : priapism  EXTREMITIES:  2+ Peripheral Pulses, No clubbing, cyanosis, or edema  PSYCH: AAOx3  NEUROLOGY: non-focal  SKIN: No rashes or lesions    LABS:                        10.1   9.46  )-----------( 218      ( 21 Jun 2023 03:05 )             30.1     06-21    139  |  101  |  16  ----------------------------<  117<H>  4.0   |  24  |  0.82    Ca    8.2<L>      21 Jun 2023 03:05      PT/INR - ( 21 Jun 2023 03:05 )   PT: 12.3 sec;   INR: 1.06 ratio         PTT - ( 21 Jun 2023 03:05 )  PTT:31.0 sec      Urinalysis Basic - ( 21 Jun 2023 03:05 )    Color: x / Appearance: x / SG: x / pH: x  Gluc: 117 mg/dL / Ketone: x  / Bili: x / Urobili: x   Blood: x / Protein: x / Nitrite: x   Leuk Esterase: x / RBC: x / WBC x   Sq Epi: x / Non Sq Epi: x / Bacteria: x        RADIOLOGY & ADDITIONAL TESTS:  Results Reviewed:   Imaging Personally Reviewed:  < from: US Doppler Scrotum (06.21.23 @ 16:10) >  *  Absence of cavernous arterial Doppler signal in the left corpus   cavernosum and mid to distal portions of the right corpus cavernosum,   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  *  High resistance low velocity waveform in the proximal portion of the   right cavernosal artery, consistent with veno-occlusive/low flow priapism.        Electrocardiogram Personally Reviewed:    COORDINATION OF CARE:  Care Discussed with Consultants/Other Providers [Y/N]: CHUCK Allen, OR today for penile shunt  Prior or Outpatient Records Reviewed [Y/N]:

## 2023-06-23 NOTE — PROGRESS NOTE ADULT - PROBLEM SELECTOR PLAN 1
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 High resistance low velocity waveform in the proximal portion of the right cavernosal artery, consistent with veno-occlusive/low flow priapism.  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 High resistance low velocity waveform in the proximal portion of the right cavernosal artery, consistent with veno-occlusive/low flow priapism.  - s/p penile decompression, corporal dilation, creation of penile shunt on 6/22  - am labs reviewed: Leukocytosis WBC 20 likely reactive postop, H/H stable  - pain service consulted, f/u recs  - dc planning home per primary team

## 2023-06-23 NOTE — DISCHARGE NOTE NURSING/CASE MANAGEMENT/SOCIAL WORK - NSDCPECAREGIVERED_GEN_ALL_CORE
Medline and carenotes for Priapism, incision care, Pain management, Narcan, Oxy IR, as well as DC Medications and side effects literature for patient reference.

## 2023-06-23 NOTE — PROGRESS NOTE ADULT - ASSESSMENT
44 y/o male s/p Penile decompression, corporal dilation, distal shunt, improved pain, stable.     -continue lopez.  -Strict I&O's  -Analgesia prn  -DVT prophylaxis  -Incentive spirometry  -Ancef  -resume diet

## 2023-06-23 NOTE — DISCHARGE NOTE PROVIDER - HOSPITAL COURSE
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 High resistance low velocity waveform in the proximal portion of the right cavernosal artery, consistent with veno-occlusive/low flow priapism. As a result the decision was made to create a distal shunt at the bedside by 14G angiocatheters into each corpora and allowing them to drain for an hour. However creation of the distal shunt was not successful as there was minimal drainage after insertion of the angiocatheters likely secondary to the duration of the priapism. As a result the decision was made to take the patient to the OR for penile decompression, corporal dilation, and creation of a penile shunt. The patient tolerated the procedure well. There were no complications. The patient was extubated in the OR and transferred to the PACU in stable condition and transferred to the urology floor. The patient's pain was controlled by IV pain medications and then by PO pain medications. The patient was placed back on home medications. On POD1 patients penis still had some woody tumescence but was much softer than when the patient initially presented and he was more comfortable. Pt has been deemed stable for discharge at this time.     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 High resistance low velocity waveform in the proximal portion of the right cavernosal artery, consistent with veno-occlusive/low flow priapism. As a result the decision was made to create a distal shunt at the bedside by 14G angiocatheters into each corpora and allowing them to drain for an hour. However creation of the distal shunt was not successful as there was minimal drainage after insertion of the angiocatheters likely secondary to the duration of the priapism. As a result the decision was made to take the patient to the OR for penile decompression, corporal dilation, and creation of a penile shunt. The patient tolerated the procedure well. There were no complications. The patient was extubated in the OR and transferred to the PACU in stable condition and transferred to the urology floor. The patient's pain was controlled by IV pain medications and then by PO pain medications. The patient was placed back on home medications. On POD1 patients penis still had some woody tumescence but was much softer than when the patient initially presented and he was more comfortable. Pt has been deemed stable for discharge at this time.      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 High resistance low velocity waveform in the proximal portion of the right cavernosal artery, consistent with veno-occlusive/low flow priapism. As a result the decision was made to create a distal shunt at the bedside by 14G angiocatheters into each corpora and allowing them to drain for an hour. However creation of the distal shunt was not successful as there was minimal drainage after insertion of the angiocatheters likely secondary to the duration of the priapism. As a result the decision was made to take the patient to the OR for penile decompression, corporal dilation, and creation of a penile shunt. The patient tolerated the procedure well. There were no complications. The patient was extubated in the OR and transferred to the PACU in stable condition and transferred to the urology floor. The patient's pain was controlled by IV pain medications and then by PO pain medications. The patient was placed back on home medications. On POD1 patients penis still had some woody tumescence but was much softer than when the patient initially presented and he was more comfortable. Pt has been deemed stable for discharge at this time. Per chronic pain ok to dispense a few tabs of oxycodone 10mg and to follow up with the [facility].

## 2023-06-23 NOTE — PROGRESS NOTE ADULT - ASSESSMENT
44 y/o M PMHx of chronic back pain, chronic opioid abuse (on suboxone 12mg),  priapism (s/p shunt procedure at [facility] 2022), transferred from [facility] to [facility] due to prolonged priapism, s/p penile decompression, corporal dilation, creation of penile shunt on 6/22

## 2023-06-23 NOTE — DISCHARGE NOTE NURSING/CASE MANAGEMENT/SOCIAL WORK - NSDCPETBCESMAN_GEN_ALL_CORE
Father  Still living? No  Family history of heart disease, Age at diagnosis: Age Unknown
If you are a smoker, it is important for your health to stop smoking. Please be aware that second hand smoke is also harmful.

## 2023-06-23 NOTE — PROGRESS NOTE ADULT - ASSESSMENT
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 High resistance low velocity waveform in the proximal portion of the right cavernosal artery, consistent with veno-occlusive/low flow priapism.    6/22: still fully erect and painful, underwent penile decompression, corporal dilation, creation of penile shunt, chronic pain consult requested  6/23: still erect, slightly less painful, lopez and HILTON removed    Plan:  -AM labs reviewed  -IVM  -d/c lopez monitor UO  -d/c HILTON  -continue ancef  -f/u chronic pain consult  -f/u medicine  -DVT prophy, IS, OOB, ambulate  -d/c home later today on augmentin x 7 days

## 2023-06-23 NOTE — DISCHARGE NOTE NURSING/CASE MANAGEMENT/SOCIAL WORK - NSDPDISTO_GEN_ALL_CORE
Pt voiding, VS stable Afebrile. pt with positive bowel sounds marcus po diet./[facility] Nursing Facility

## 2023-06-26 ENCOUNTER — NON-APPOINTMENT (OUTPATIENT)
Age: 43
End: 2023-06-26

## 2023-09-17 ENCOUNTER — INPATIENT (INPATIENT)
Facility: HOSPITAL | Age: 43
LOS: 4 days | Discharge: HOME | DRG: 184 | End: 2023-09-22
Attending: SURGERY | Admitting: SURGERY
Payer: MEDICAID

## 2023-09-17 VITALS
DIASTOLIC BLOOD PRESSURE: 93 MMHG | OXYGEN SATURATION: 95 % | TEMPERATURE: 98 F | HEART RATE: 91 BPM | RESPIRATION RATE: 18 BRPM | WEIGHT: 273.37 LBS | SYSTOLIC BLOOD PRESSURE: 177 MMHG

## 2023-09-17 DIAGNOSIS — K44.9 DIAPHRAGMATIC HERNIA WITHOUT OBSTRUCTION OR GANGRENE: ICD-10-CM

## 2023-09-17 PROBLEM — N48.30 PRIAPISM, UNSPECIFIED: Chronic | Status: ACTIVE | Noted: 2023-06-21

## 2023-09-17 LAB
ALBUMIN SERPL ELPH-MCNC: 4.3 G/DL — SIGNIFICANT CHANGE UP (ref 3.3–5.2)
ALP SERPL-CCNC: 125 U/L — HIGH (ref 40–120)
ALT FLD-CCNC: 138 U/L — HIGH
ANION GAP SERPL CALC-SCNC: 17 MMOL/L — SIGNIFICANT CHANGE UP (ref 5–17)
APTT BLD: 32.1 SEC — SIGNIFICANT CHANGE UP (ref 24.5–35.6)
AST SERPL-CCNC: 134 U/L — HIGH
BASOPHILS # BLD AUTO: 0.03 K/UL — SIGNIFICANT CHANGE UP (ref 0–0.2)
BASOPHILS NFR BLD AUTO: 0.2 % — SIGNIFICANT CHANGE UP (ref 0–2)
BILIRUB SERPL-MCNC: <0.2 MG/DL — LOW (ref 0.4–2)
BLD GP AB SCN SERPL QL: SIGNIFICANT CHANGE UP
BUN SERPL-MCNC: 11.8 MG/DL — SIGNIFICANT CHANGE UP (ref 8–20)
CALCIUM SERPL-MCNC: 8.6 MG/DL — SIGNIFICANT CHANGE UP (ref 8.4–10.5)
CHLORIDE SERPL-SCNC: 98 MMOL/L — SIGNIFICANT CHANGE UP (ref 96–108)
CO2 SERPL-SCNC: 21 MMOL/L — LOW (ref 22–29)
CREAT SERPL-MCNC: 0.81 MG/DL — SIGNIFICANT CHANGE UP (ref 0.5–1.3)
EGFR: 112 ML/MIN/1.73M2 — SIGNIFICANT CHANGE UP
EOSINOPHIL # BLD AUTO: 0.02 K/UL — SIGNIFICANT CHANGE UP (ref 0–0.5)
EOSINOPHIL NFR BLD AUTO: 0.1 % — SIGNIFICANT CHANGE UP (ref 0–6)
ETHANOL SERPL-MCNC: 151 MG/DL — HIGH (ref 0–9)
GLUCOSE SERPL-MCNC: 158 MG/DL — HIGH (ref 70–99)
HCT VFR BLD CALC: 42 % — SIGNIFICANT CHANGE UP (ref 39–50)
HGB BLD-MCNC: 14.2 G/DL — SIGNIFICANT CHANGE UP (ref 13–17)
IMM GRANULOCYTES NFR BLD AUTO: 0.6 % — SIGNIFICANT CHANGE UP (ref 0–0.9)
INR BLD: 0.96 RATIO — SIGNIFICANT CHANGE UP (ref 0.85–1.18)
LACTATE SERPL-SCNC: 1.1 MMOL/L — SIGNIFICANT CHANGE UP (ref 0.5–2)
LACTATE SERPL-SCNC: 3.1 MMOL/L — HIGH (ref 0.5–2)
LIDOCAIN IGE QN: 14 U/L — LOW (ref 22–51)
LYMPHOCYTES # BLD AUTO: 13.8 % — SIGNIFICANT CHANGE UP (ref 13–44)
LYMPHOCYTES # BLD AUTO: 2.36 K/UL — SIGNIFICANT CHANGE UP (ref 1–3.3)
MCHC RBC-ENTMCNC: 26.8 PG — LOW (ref 27–34)
MCHC RBC-ENTMCNC: 33.8 GM/DL — SIGNIFICANT CHANGE UP (ref 32–36)
MCV RBC AUTO: 79.2 FL — LOW (ref 80–100)
MONOCYTES # BLD AUTO: 1.11 K/UL — HIGH (ref 0–0.9)
MONOCYTES NFR BLD AUTO: 6.5 % — SIGNIFICANT CHANGE UP (ref 2–14)
MRSA PCR RESULT.: SIGNIFICANT CHANGE UP
NEUTROPHILS # BLD AUTO: 13.47 K/UL — HIGH (ref 1.8–7.4)
NEUTROPHILS NFR BLD AUTO: 78.8 % — HIGH (ref 43–77)
PLATELET # BLD AUTO: 278 K/UL — SIGNIFICANT CHANGE UP (ref 150–400)
POTASSIUM SERPL-MCNC: 3.7 MMOL/L — SIGNIFICANT CHANGE UP (ref 3.5–5.3)
POTASSIUM SERPL-SCNC: 3.7 MMOL/L — SIGNIFICANT CHANGE UP (ref 3.5–5.3)
PROT SERPL-MCNC: 7.3 G/DL — SIGNIFICANT CHANGE UP (ref 6.6–8.7)
PROTHROM AB SERPL-ACNC: 10.7 SEC — SIGNIFICANT CHANGE UP (ref 9.5–13)
RBC # BLD: 5.3 M/UL — SIGNIFICANT CHANGE UP (ref 4.2–5.8)
RBC # FLD: 13 % — SIGNIFICANT CHANGE UP (ref 10.3–14.5)
S AUREUS DNA NOSE QL NAA+PROBE: SIGNIFICANT CHANGE UP
SODIUM SERPL-SCNC: 136 MMOL/L — SIGNIFICANT CHANGE UP (ref 135–145)
WBC # BLD: 17.09 K/UL — HIGH (ref 3.8–10.5)
WBC # FLD AUTO: 17.09 K/UL — HIGH (ref 3.8–10.5)

## 2023-09-17 PROCEDURE — 72125 CT NECK SPINE W/O DYE: CPT | Mod: 26,MA,77

## 2023-09-17 PROCEDURE — 73130 X-RAY EXAM OF HAND: CPT | Mod: 26,50

## 2023-09-17 PROCEDURE — 73090 X-RAY EXAM OF FOREARM: CPT | Mod: 26,LT

## 2023-09-17 PROCEDURE — 73070 X-RAY EXAM OF ELBOW: CPT | Mod: 26,LT

## 2023-09-17 PROCEDURE — 71045 X-RAY EXAM CHEST 1 VIEW: CPT | Mod: 26,76

## 2023-09-17 PROCEDURE — 99291 CRITICAL CARE FIRST HOUR: CPT

## 2023-09-17 PROCEDURE — 73600 X-RAY EXAM OF ANKLE: CPT | Mod: 26,RT

## 2023-09-17 PROCEDURE — 93010 ELECTROCARDIOGRAM REPORT: CPT

## 2023-09-17 PROCEDURE — 73560 X-RAY EXAM OF KNEE 1 OR 2: CPT | Mod: 26,LT

## 2023-09-17 PROCEDURE — 70450 CT HEAD/BRAIN W/O DYE: CPT | Mod: 26,59,77,MA

## 2023-09-17 PROCEDURE — 70498 CT ANGIOGRAPHY NECK: CPT | Mod: 26,MA

## 2023-09-17 PROCEDURE — 70496 CT ANGIOGRAPHY HEAD: CPT | Mod: 26,MA

## 2023-09-17 PROCEDURE — 73630 X-RAY EXAM OF FOOT: CPT | Mod: 26,77,RT

## 2023-09-17 PROCEDURE — 71260 CT THORAX DX C+: CPT | Mod: 26,MA,77

## 2023-09-17 PROCEDURE — 74177 CT ABD & PELVIS W/CONTRAST: CPT | Mod: 26,MA,77

## 2023-09-17 RX ORDER — FENTANYL CITRATE 50 UG/ML
25 INJECTION INTRAVENOUS ONCE
Refills: 0 | Status: DISCONTINUED | OUTPATIENT
Start: 2023-09-17 | End: 2023-09-17

## 2023-09-17 RX ORDER — CEFAZOLIN SODIUM 1 G
2000 VIAL (EA) INJECTION ONCE
Refills: 0 | Status: COMPLETED | OUTPATIENT
Start: 2023-09-17 | End: 2023-09-17

## 2023-09-17 RX ORDER — INFLUENZA VIRUS VACCINE 15; 15; 15; 15 UG/.5ML; UG/.5ML; UG/.5ML; UG/.5ML
0.5 SUSPENSION INTRAMUSCULAR ONCE
Refills: 0 | Status: COMPLETED | OUTPATIENT
Start: 2023-09-17 | End: 2023-09-17

## 2023-09-17 RX ORDER — NICOTINE POLACRILEX 2 MG
1 GUM BUCCAL DAILY
Refills: 0 | Status: DISCONTINUED | OUTPATIENT
Start: 2023-09-17 | End: 2023-09-22

## 2023-09-17 RX ORDER — METOPROLOL TARTRATE 50 MG
12.5 TABLET ORAL DAILY
Refills: 0 | Status: DISCONTINUED | OUTPATIENT
Start: 2023-09-17 | End: 2023-09-18

## 2023-09-17 RX ORDER — KETOROLAC TROMETHAMINE 30 MG/ML
15 SYRINGE (ML) INJECTION EVERY 6 HOURS
Refills: 0 | Status: DISCONTINUED | OUTPATIENT
Start: 2023-09-17 | End: 2023-09-17

## 2023-09-17 RX ORDER — HYDROMORPHONE HYDROCHLORIDE 2 MG/ML
0.5 INJECTION INTRAMUSCULAR; INTRAVENOUS; SUBCUTANEOUS EVERY 4 HOURS
Refills: 0 | Status: DISCONTINUED | OUTPATIENT
Start: 2023-09-17 | End: 2023-09-22

## 2023-09-17 RX ORDER — OXYCODONE HYDROCHLORIDE 5 MG/1
5 TABLET ORAL EVERY 4 HOURS
Refills: 0 | Status: DISCONTINUED | OUTPATIENT
Start: 2023-09-17 | End: 2023-09-18

## 2023-09-17 RX ORDER — CHLORHEXIDINE GLUCONATE 213 G/1000ML
1 SOLUTION TOPICAL DAILY
Refills: 0 | Status: DISCONTINUED | OUTPATIENT
Start: 2023-09-17 | End: 2023-09-18

## 2023-09-17 RX ORDER — ENOXAPARIN SODIUM 100 MG/ML
40 INJECTION SUBCUTANEOUS EVERY 12 HOURS
Refills: 0 | Status: DISCONTINUED | OUTPATIENT
Start: 2023-09-17 | End: 2023-09-22

## 2023-09-17 RX ORDER — ACETAMINOPHEN 500 MG
1000 TABLET ORAL EVERY 6 HOURS
Refills: 0 | Status: COMPLETED | OUTPATIENT
Start: 2023-09-17 | End: 2023-09-18

## 2023-09-17 RX ORDER — ONDANSETRON 8 MG/1
4 TABLET, FILM COATED ORAL ONCE
Refills: 0 | Status: COMPLETED | OUTPATIENT
Start: 2023-09-17 | End: 2023-09-17

## 2023-09-17 RX ORDER — SENNA PLUS 8.6 MG/1
2 TABLET ORAL AT BEDTIME
Refills: 0 | Status: DISCONTINUED | OUTPATIENT
Start: 2023-09-17 | End: 2023-09-22

## 2023-09-17 RX ORDER — POLYETHYLENE GLYCOL 3350 17 G/17G
17 POWDER, FOR SOLUTION ORAL DAILY
Refills: 0 | Status: DISCONTINUED | OUTPATIENT
Start: 2023-09-17 | End: 2023-09-20

## 2023-09-17 RX ORDER — SODIUM CHLORIDE 9 MG/ML
1000 INJECTION INTRAMUSCULAR; INTRAVENOUS; SUBCUTANEOUS
Refills: 0 | Status: DISCONTINUED | OUTPATIENT
Start: 2023-09-17 | End: 2023-09-18

## 2023-09-17 RX ORDER — LABETALOL HCL 100 MG
10 TABLET ORAL ONCE
Refills: 0 | Status: COMPLETED | OUTPATIENT
Start: 2023-09-17 | End: 2023-09-17

## 2023-09-17 RX ORDER — ACETAMINOPHEN 500 MG
1000 TABLET ORAL EVERY 6 HOURS
Refills: 0 | Status: DISCONTINUED | OUTPATIENT
Start: 2023-09-17 | End: 2023-09-17

## 2023-09-17 RX ORDER — METOPROLOL TARTRATE 50 MG
5 TABLET ORAL ONCE
Refills: 0 | Status: COMPLETED | OUTPATIENT
Start: 2023-09-17 | End: 2023-09-17

## 2023-09-17 RX ORDER — OXYCODONE HYDROCHLORIDE 5 MG/1
10 TABLET ORAL EVERY 4 HOURS
Refills: 0 | Status: DISCONTINUED | OUTPATIENT
Start: 2023-09-17 | End: 2023-09-18

## 2023-09-17 RX ORDER — LIDOCAINE 4 G/100G
3 CREAM TOPICAL EVERY 24 HOURS
Refills: 0 | Status: DISCONTINUED | OUTPATIENT
Start: 2023-09-17 | End: 2023-09-22

## 2023-09-17 RX ORDER — GABAPENTIN 400 MG/1
300 CAPSULE ORAL EVERY 8 HOURS
Refills: 0 | Status: DISCONTINUED | OUTPATIENT
Start: 2023-09-17 | End: 2023-09-17

## 2023-09-17 RX ORDER — CHLORHEXIDINE GLUCONATE 213 G/1000ML
1 SOLUTION TOPICAL
Refills: 0 | Status: DISCONTINUED | OUTPATIENT
Start: 2023-09-17 | End: 2023-09-17

## 2023-09-17 RX ORDER — TETANUS AND DIPHTHERIA TOXOIDS ADSORBED 2; 2 [LF]/.5ML; [LF]/.5ML
0.5 INJECTION INTRAMUSCULAR ONCE
Refills: 0 | Status: COMPLETED | OUTPATIENT
Start: 2023-09-17 | End: 2023-09-17

## 2023-09-17 RX ORDER — KETOROLAC TROMETHAMINE 30 MG/ML
15 SYRINGE (ML) INJECTION EVERY 6 HOURS
Refills: 0 | Status: DISCONTINUED | OUTPATIENT
Start: 2023-09-17 | End: 2023-09-18

## 2023-09-17 RX ORDER — GABAPENTIN 400 MG/1
300 CAPSULE ORAL EVERY 8 HOURS
Refills: 0 | Status: DISCONTINUED | OUTPATIENT
Start: 2023-09-17 | End: 2023-09-21

## 2023-09-17 RX ORDER — METHOCARBAMOL 500 MG/1
750 TABLET, FILM COATED ORAL EVERY 12 HOURS
Refills: 0 | Status: DISCONTINUED | OUTPATIENT
Start: 2023-09-17 | End: 2023-09-18

## 2023-09-17 RX ORDER — METHOCARBAMOL 500 MG/1
750 TABLET, FILM COATED ORAL EVERY 12 HOURS
Refills: 0 | Status: DISCONTINUED | OUTPATIENT
Start: 2023-09-17 | End: 2023-09-17

## 2023-09-17 RX ORDER — LIDOCAINE 4 G/100G
3 CREAM TOPICAL EVERY 24 HOURS
Refills: 0 | Status: DISCONTINUED | OUTPATIENT
Start: 2023-09-17 | End: 2023-09-17

## 2023-09-17 RX ORDER — SODIUM CHLORIDE 9 MG/ML
250 INJECTION INTRAMUSCULAR; INTRAVENOUS; SUBCUTANEOUS ONCE
Refills: 0 | Status: COMPLETED | OUTPATIENT
Start: 2023-09-17 | End: 2023-09-17

## 2023-09-17 RX ADMIN — Medication 15 MILLIGRAM(S): at 11:19

## 2023-09-17 RX ADMIN — GABAPENTIN 300 MILLIGRAM(S): 400 CAPSULE ORAL at 14:52

## 2023-09-17 RX ADMIN — Medication 1000 MILLIGRAM(S): at 18:12

## 2023-09-17 RX ADMIN — LIDOCAINE 3 PATCH: 4 CREAM TOPICAL at 19:45

## 2023-09-17 RX ADMIN — OXYCODONE HYDROCHLORIDE 5 MILLIGRAM(S): 5 TABLET ORAL at 22:50

## 2023-09-17 RX ADMIN — SODIUM CHLORIDE 250 MILLILITER(S): 9 INJECTION INTRAMUSCULAR; INTRAVENOUS; SUBCUTANEOUS at 07:16

## 2023-09-17 RX ADMIN — ONDANSETRON 4 MILLIGRAM(S): 8 TABLET, FILM COATED ORAL at 06:48

## 2023-09-17 RX ADMIN — Medication 400 MILLIGRAM(S): at 11:19

## 2023-09-17 RX ADMIN — Medication 15 MILLIGRAM(S): at 11:53

## 2023-09-17 RX ADMIN — Medication 10 MILLIGRAM(S): at 12:58

## 2023-09-17 RX ADMIN — Medication 15 MILLIGRAM(S): at 17:51

## 2023-09-17 RX ADMIN — METHOCARBAMOL 750 MILLIGRAM(S): 500 TABLET, FILM COATED ORAL at 14:52

## 2023-09-17 RX ADMIN — Medication 5 MILLIGRAM(S): at 18:39

## 2023-09-17 RX ADMIN — SENNA PLUS 2 TABLET(S): 8.6 TABLET ORAL at 21:45

## 2023-09-17 RX ADMIN — Medication 2000 MILLIGRAM(S): at 07:21

## 2023-09-17 RX ADMIN — Medication 1000 MILLIGRAM(S): at 11:53

## 2023-09-17 RX ADMIN — GABAPENTIN 300 MILLIGRAM(S): 400 CAPSULE ORAL at 22:19

## 2023-09-17 RX ADMIN — FENTANYL CITRATE 25 MICROGRAM(S): 50 INJECTION INTRAVENOUS at 07:20

## 2023-09-17 RX ADMIN — Medication 12.5 MILLIGRAM(S): at 18:39

## 2023-09-17 RX ADMIN — LIDOCAINE 3 PATCH: 4 CREAM TOPICAL at 08:54

## 2023-09-17 RX ADMIN — ENOXAPARIN SODIUM 40 MILLIGRAM(S): 100 INJECTION SUBCUTANEOUS at 17:51

## 2023-09-17 RX ADMIN — LIDOCAINE 3 PATCH: 4 CREAM TOPICAL at 21:30

## 2023-09-17 RX ADMIN — Medication 15 MILLIGRAM(S): at 18:12

## 2023-09-17 RX ADMIN — OXYCODONE HYDROCHLORIDE 10 MILLIGRAM(S): 5 TABLET ORAL at 12:59

## 2023-09-17 RX ADMIN — TETANUS AND DIPHTHERIA TOXOIDS ADSORBED 0.5 MILLILITER(S): 2; 2 INJECTION INTRAMUSCULAR at 07:47

## 2023-09-17 RX ADMIN — SODIUM CHLORIDE 125 MILLILITER(S): 9 INJECTION INTRAMUSCULAR; INTRAVENOUS; SUBCUTANEOUS at 08:55

## 2023-09-17 RX ADMIN — Medication 1 PATCH: at 21:42

## 2023-09-17 RX ADMIN — OXYCODONE HYDROCHLORIDE 10 MILLIGRAM(S): 5 TABLET ORAL at 13:59

## 2023-09-17 RX ADMIN — Medication 400 MILLIGRAM(S): at 23:40

## 2023-09-17 RX ADMIN — Medication 400 MILLIGRAM(S): at 17:51

## 2023-09-17 RX ADMIN — OXYCODONE HYDROCHLORIDE 5 MILLIGRAM(S): 5 TABLET ORAL at 22:19
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
## 2023-09-17 NOTE — ED PROVIDER NOTE - PHYSICAL EXAMINATION
Head: NC, abrasions to nose  EENT: EOMI, no scleral icterus  Cardiac: RRR, no apparent murmurs, no lower extremity edema  Respiratory: CTABL, no respiratory distress   Abdomen: soft, ND, NT, nonperitonitic  MSK/Vascular: full ROM, soft compartments, warm extremities  Neuro: AAOx3, sensation to light touch intact  Psych: calm, cooperative  Skin: abrasions on BL shoulder and left hand,

## 2023-09-17 NOTE — H&P ADULT - NSHPREVIEWOFSYSTEMS_GEN_ALL_CORE
REVIEW OF SYSTEMS:    CONSTITUTIONAL:  weakness, no fevers or chills  EYES/ENT:  No visual changes;  No vertigo or throat pain   NECK:  w/ c- collar in place  RESPIRATORY:  No cough, wheezing, hemoptysis; c/o shortness of breath  CARDIOVASCULAR:  R sided musuclar chest pain o, no palpitations  GASTROINTESTINAL:  No abdominal or epigastric pain. No nausea, vomiting, or hematemesis; No diarrhea or constipation. No melena or hematochezia.  GENITOURINARY:  No dysuria, frequency or hematuria  MUSCULOSKELETAL:  FROM all extremities, normal strength, No calf tenderness  NEUROLOGICAL:  No numbness or weakness  SKIN:  abarasions on RUE, L hand, b/l knees, R foot and b/l buttocks

## 2023-09-17 NOTE — ED ADULT NURSE NOTE - NSFALLHARMRISKINTERV_ED_ALL_ED

## 2023-09-17 NOTE — PATIENT PROFILE ADULT - NSPROGENOTHERPROVIDER_GEN_A_NUR
-- DO NOT REPLY / DO NOT REPLY ALL --  -- Message is from Engagement Center Operations (ECO) --    ONLY TO BE USED WITHIN A REFILL MEDICATION ENCOUNTER    Med Refill  Is the patient currently having any symptoms?: No/Non-Emergent symptoms    Name of medication requested: See pended med    Has patient contacted the pharmacy? Not Applicable-Patient states reason is no more refills     Is this the first request for the medication in the last 48 hours?: Yes      Patient is requesting a medication refill - medication is on active list      Full name of the provider who ordered the medication: Gertrude GRIFFITH, [facility] site name / Account # for provider: [facility] - [address]     Preferred Pharmacy: Pharmacy  [id number] - [place], [place] - [address]    Patient confirmed the above pharmacy as correct?  Yes      Caller Information       Type Contact Phone/Fax    09/11/2023 01:02 PM CDT Phone (Incoming) Felicita Walls (Self) [phone] (H)          Alternative phone number: no    Can a detailed message be left?: Yes    Patient is completely out of medication: Verify if patient is currently experiencing symptoms. If patient is symptomatic, proceed with front end triage instead of medication refill. If patient is not symptomatic but is completely out of medication, bell as High priority when routing. Inform patient: “Please call back with any questions or concerns and if your condition becomes life threatening, you should seek immediate medical assistance by calling 911 or going to the Emergency Department for evaluation.”    Inform all patients: \"If the clinical team needs to contact you regarding this refill, please be aware the return phone call may come from an unidentified or out of state phone number and your refill request will be addressed as soon as the clinical team reviews your message.\"   none

## 2023-09-17 NOTE — H&P ADULT - NSHPSOURCEINFORD_GEN_ALL_CORE
Patient/Other Healthcare Facility Patient/Home Health Aide or Other Non-Family Caregiver/Other Healthcare Facility

## 2023-09-17 NOTE — PATIENT PROFILE ADULT - FALL HARM RISK - UNIVERSAL INTERVENTIONS
Bed in lowest position, wheels locked, appropriate side rails in place/Call bell, personal items and telephone in reach/Instruct patient to call for assistance before getting out of bed or chair/Non-slip footwear when patient is out of bed/[place] to call system/Physically safe environment - no spills, clutter or unnecessary equipment/Purposeful Proactive Rounding/Room/bathroom lighting operational, light cord in reach
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
## 2023-09-17 NOTE — PATIENT PROFILE ADULT - FUNCTIONAL ASSESSMENT - BASIC MOBILITY 6.
4-calculated by average/Not able to assess (calculate score using [facility] averaging method)

## 2023-09-18 DIAGNOSIS — H66.90 OTITIS MEDIA, UNSPECIFIED, UNSPECIFIED EAR: ICD-10-CM

## 2023-09-18 LAB
ANION GAP SERPL CALC-SCNC: 13 MMOL/L — SIGNIFICANT CHANGE UP (ref 5–17)
BASOPHILS # BLD AUTO: 0.02 K/UL — SIGNIFICANT CHANGE UP (ref 0–0.2)
BASOPHILS NFR BLD AUTO: 0.2 % — SIGNIFICANT CHANGE UP (ref 0–2)
BUN SERPL-MCNC: 15.8 MG/DL — SIGNIFICANT CHANGE UP (ref 8–20)
CALCIUM SERPL-MCNC: 8.6 MG/DL — SIGNIFICANT CHANGE UP (ref 8.4–10.5)
CHLORIDE SERPL-SCNC: 103 MMOL/L — SIGNIFICANT CHANGE UP (ref 96–108)
CO2 SERPL-SCNC: 23 MMOL/L — SIGNIFICANT CHANGE UP (ref 22–29)
CREAT SERPL-MCNC: 0.89 MG/DL — SIGNIFICANT CHANGE UP (ref 0.5–1.3)
EGFR: 109 ML/MIN/1.73M2 — SIGNIFICANT CHANGE UP
EOSINOPHIL # BLD AUTO: 0.07 K/UL — SIGNIFICANT CHANGE UP (ref 0–0.5)
EOSINOPHIL NFR BLD AUTO: 0.7 % — SIGNIFICANT CHANGE UP (ref 0–6)
GLUCOSE SERPL-MCNC: 133 MG/DL — HIGH (ref 70–99)
GRAM STN FLD: SIGNIFICANT CHANGE UP
HCT VFR BLD CALC: 35.8 % — LOW (ref 39–50)
HGB BLD-MCNC: 11.9 G/DL — LOW (ref 13–17)
IMM GRANULOCYTES NFR BLD AUTO: 0.3 % — SIGNIFICANT CHANGE UP (ref 0–0.9)
LYMPHOCYTES # BLD AUTO: 2.51 K/UL — SIGNIFICANT CHANGE UP (ref 1–3.3)
LYMPHOCYTES # BLD AUTO: 26.4 % — SIGNIFICANT CHANGE UP (ref 13–44)
MAGNESIUM SERPL-MCNC: 2 MG/DL — SIGNIFICANT CHANGE UP (ref 1.6–2.6)
MCHC RBC-ENTMCNC: 26.7 PG — LOW (ref 27–34)
MCHC RBC-ENTMCNC: 33.2 GM/DL — SIGNIFICANT CHANGE UP (ref 32–36)
MCV RBC AUTO: 80.4 FL — SIGNIFICANT CHANGE UP (ref 80–100)
MONOCYTES # BLD AUTO: 1.12 K/UL — HIGH (ref 0–0.9)
MONOCYTES NFR BLD AUTO: 11.8 % — SIGNIFICANT CHANGE UP (ref 2–14)
NEUTROPHILS # BLD AUTO: 5.75 K/UL — SIGNIFICANT CHANGE UP (ref 1.8–7.4)
NEUTROPHILS NFR BLD AUTO: 60.6 % — SIGNIFICANT CHANGE UP (ref 43–77)
PHOSPHATE SERPL-MCNC: 2.9 MG/DL — SIGNIFICANT CHANGE UP (ref 2.4–4.7)
PLATELET # BLD AUTO: 231 K/UL — SIGNIFICANT CHANGE UP (ref 150–400)
POTASSIUM SERPL-MCNC: 3.7 MMOL/L — SIGNIFICANT CHANGE UP (ref 3.5–5.3)
POTASSIUM SERPL-SCNC: 3.7 MMOL/L — SIGNIFICANT CHANGE UP (ref 3.5–5.3)
RBC # BLD: 4.45 M/UL — SIGNIFICANT CHANGE UP (ref 4.2–5.8)
RBC # FLD: 13.2 % — SIGNIFICANT CHANGE UP (ref 10.3–14.5)
SODIUM SERPL-SCNC: 139 MMOL/L — SIGNIFICANT CHANGE UP (ref 135–145)
SPECIMEN SOURCE: SIGNIFICANT CHANGE UP
WBC # BLD: 9.5 K/UL — SIGNIFICANT CHANGE UP (ref 3.8–10.5)
WBC # FLD AUTO: 9.5 K/UL — SIGNIFICANT CHANGE UP (ref 3.8–10.5)

## 2023-09-18 PROCEDURE — 99291 CRITICAL CARE FIRST HOUR: CPT

## 2023-09-18 PROCEDURE — 71045 X-RAY EXAM CHEST 1 VIEW: CPT | Mod: 26

## 2023-09-18 PROCEDURE — 99221 1ST HOSP IP/OBS SF/LOW 40: CPT

## 2023-09-18 RX ORDER — ACETAMINOPHEN 500 MG
650 TABLET ORAL EVERY 6 HOURS
Refills: 0 | Status: DISCONTINUED | OUTPATIENT
Start: 2023-09-18 | End: 2023-09-19

## 2023-09-18 RX ORDER — ACETAMINOPHEN 500 MG
650 TABLET ORAL EVERY 6 HOURS
Refills: 0 | Status: DISCONTINUED | OUTPATIENT
Start: 2023-09-18 | End: 2023-09-18

## 2023-09-18 RX ORDER — SODIUM,POTASSIUM PHOSPHATES 278-250MG
1 POWDER IN PACKET (EA) ORAL ONCE
Refills: 0 | Status: COMPLETED | OUTPATIENT
Start: 2023-09-18 | End: 2023-09-18

## 2023-09-18 RX ORDER — OXYCODONE HYDROCHLORIDE 5 MG/1
10 TABLET ORAL EVERY 4 HOURS
Refills: 0 | Status: DISCONTINUED | OUTPATIENT
Start: 2023-09-18 | End: 2023-09-19

## 2023-09-18 RX ORDER — LABETALOL HCL 100 MG
10 TABLET ORAL ONCE
Refills: 0 | Status: COMPLETED | OUTPATIENT
Start: 2023-09-18 | End: 2023-09-18

## 2023-09-18 RX ORDER — METHOCARBAMOL 500 MG/1
750 TABLET, FILM COATED ORAL EVERY 8 HOURS
Refills: 0 | Status: DISCONTINUED | OUTPATIENT
Start: 2023-09-18 | End: 2023-09-19

## 2023-09-18 RX ORDER — POTASSIUM CHLORIDE 20 MEQ
40 PACKET (EA) ORAL ONCE
Refills: 0 | Status: COMPLETED | OUTPATIENT
Start: 2023-09-18 | End: 2023-09-18

## 2023-09-18 RX ORDER — OXYCODONE HYDROCHLORIDE 5 MG/1
5 TABLET ORAL EVERY 4 HOURS
Refills: 0 | Status: DISCONTINUED | OUTPATIENT
Start: 2023-09-18 | End: 2023-09-19

## 2023-09-18 RX ORDER — BACITRACIN ZINC 500 UNIT/G
1 OINTMENT IN PACKET (EA) TOPICAL
Refills: 0 | Status: DISCONTINUED | OUTPATIENT
Start: 2023-09-18 | End: 2023-09-22

## 2023-09-18 RX ORDER — POTASSIUM CHLORIDE 20 MEQ
40 PACKET (EA) ORAL ONCE
Refills: 0 | Status: DISCONTINUED | OUTPATIENT
Start: 2023-09-18 | End: 2023-09-18

## 2023-09-18 RX ADMIN — POLYETHYLENE GLYCOL 3350 17 GRAM(S): 17 POWDER, FOR SOLUTION ORAL at 12:00

## 2023-09-18 RX ADMIN — GABAPENTIN 300 MILLIGRAM(S): 400 CAPSULE ORAL at 21:14

## 2023-09-18 RX ADMIN — Medication 400 MILLIGRAM(S): at 05:16

## 2023-09-18 RX ADMIN — Medication 1 PATCH: at 12:44

## 2023-09-18 RX ADMIN — GABAPENTIN 300 MILLIGRAM(S): 400 CAPSULE ORAL at 13:02

## 2023-09-18 RX ADMIN — LIDOCAINE 3 PATCH: 4 CREAM TOPICAL at 19:00

## 2023-09-18 RX ADMIN — ENOXAPARIN SODIUM 40 MILLIGRAM(S): 100 INJECTION SUBCUTANEOUS at 05:56

## 2023-09-18 RX ADMIN — OXYCODONE HYDROCHLORIDE 10 MILLIGRAM(S): 5 TABLET ORAL at 23:40

## 2023-09-18 RX ADMIN — GABAPENTIN 300 MILLIGRAM(S): 400 CAPSULE ORAL at 05:56

## 2023-09-18 RX ADMIN — Medication 10 MILLIGRAM(S): at 13:02

## 2023-09-18 RX ADMIN — Medication 12.5 MILLIGRAM(S): at 05:56

## 2023-09-18 RX ADMIN — Medication 650 MILLIGRAM(S): at 23:40

## 2023-09-18 RX ADMIN — Medication 40 MILLIEQUIVALENT(S): at 06:29

## 2023-09-18 RX ADMIN — OXYCODONE HYDROCHLORIDE 5 MILLIGRAM(S): 5 TABLET ORAL at 16:02

## 2023-09-18 RX ADMIN — Medication 1 PATCH: at 06:01

## 2023-09-18 RX ADMIN — Medication 15 MILLIGRAM(S): at 00:18

## 2023-09-18 RX ADMIN — Medication 15 MILLIGRAM(S): at 12:15

## 2023-09-18 RX ADMIN — SENNA PLUS 2 TABLET(S): 8.6 TABLET ORAL at 21:14

## 2023-09-18 RX ADMIN — HYDROMORPHONE HYDROCHLORIDE 0.5 MILLIGRAM(S): 2 INJECTION INTRAMUSCULAR; INTRAVENOUS; SUBCUTANEOUS at 12:05

## 2023-09-18 RX ADMIN — Medication 650 MILLIGRAM(S): at 17:18

## 2023-09-18 RX ADMIN — OXYCODONE HYDROCHLORIDE 5 MILLIGRAM(S): 5 TABLET ORAL at 16:30

## 2023-09-18 RX ADMIN — Medication 15 MILLIGRAM(S): at 12:30

## 2023-09-18 RX ADMIN — HYDROMORPHONE HYDROCHLORIDE 0.5 MILLIGRAM(S): 2 INJECTION INTRAMUSCULAR; INTRAVENOUS; SUBCUTANEOUS at 08:05

## 2023-09-18 RX ADMIN — ENOXAPARIN SODIUM 40 MILLIGRAM(S): 100 INJECTION SUBCUTANEOUS at 17:17

## 2023-09-18 RX ADMIN — HYDROMORPHONE HYDROCHLORIDE 0.5 MILLIGRAM(S): 2 INJECTION INTRAMUSCULAR; INTRAVENOUS; SUBCUTANEOUS at 21:13

## 2023-09-18 RX ADMIN — Medication 650 MILLIGRAM(S): at 12:35

## 2023-09-18 RX ADMIN — METHOCARBAMOL 750 MILLIGRAM(S): 500 TABLET, FILM COATED ORAL at 01:42

## 2023-09-18 RX ADMIN — METHOCARBAMOL 750 MILLIGRAM(S): 500 TABLET, FILM COATED ORAL at 21:14

## 2023-09-18 RX ADMIN — Medication 15 MILLIGRAM(S): at 05:55

## 2023-09-18 RX ADMIN — LIDOCAINE 3 PATCH: 4 CREAM TOPICAL at 12:08

## 2023-09-18 RX ADMIN — Medication 15 MILLIGRAM(S): at 17:17

## 2023-09-18 RX ADMIN — OXYCODONE HYDROCHLORIDE 10 MILLIGRAM(S): 5 TABLET ORAL at 18:28

## 2023-09-18 RX ADMIN — Medication 1 APPLICATION(S): at 18:33

## 2023-09-18 RX ADMIN — Medication 650 MILLIGRAM(S): at 12:05

## 2023-09-18 RX ADMIN — HYDROMORPHONE HYDROCHLORIDE 0.5 MILLIGRAM(S): 2 INJECTION INTRAMUSCULAR; INTRAVENOUS; SUBCUTANEOUS at 12:20

## 2023-09-18 RX ADMIN — OXYCODONE HYDROCHLORIDE 10 MILLIGRAM(S): 5 TABLET ORAL at 08:45

## 2023-09-18 RX ADMIN — OXYCODONE HYDROCHLORIDE 10 MILLIGRAM(S): 5 TABLET ORAL at 08:15

## 2023-09-18 RX ADMIN — Medication 1 PACKET(S): at 06:29

## 2023-09-18 RX ADMIN — HYDROMORPHONE HYDROCHLORIDE 0.5 MILLIGRAM(S): 2 INJECTION INTRAMUSCULAR; INTRAVENOUS; SUBCUTANEOUS at 08:15

## 2023-09-18 RX ADMIN — HYDROMORPHONE HYDROCHLORIDE 0.5 MILLIGRAM(S): 2 INJECTION INTRAMUSCULAR; INTRAVENOUS; SUBCUTANEOUS at 22:00

## 2023-09-18 RX ADMIN — METHOCARBAMOL 750 MILLIGRAM(S): 500 TABLET, FILM COATED ORAL at 13:02

## 2023-09-18 NOTE — PROGRESS NOTE ADULT - CRITICAL CARE ATTENDING COMMENT
I have seen and examined this patient with the SICU team.  No acute events overnight.  Patient appears well this morning.  Neuro: Pain controlled with MMPC. Alert and oriented, no neuro deficits.  Will consult pain management for possible rib block.  Cards: HDS.  Resp: Saturating well on 2L NC, IS with 2200cc, PIC 6 due to pain.    GI: Regular diet, tolerating.  : UOP WNL.  Heme: Hgb WNL, transfuse PRN.  ID: Trend WBC, culture if febrile.  Lovenox for DVT PPx.  Stable for downgrade to surgical floor.
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
## 2023-09-18 NOTE — CHART NOTE - NSCHARTNOTEFT_GEN_A_CORE
SICU TRANSFER NOTE  -----------------------------  ICU Admission Date: XXXXX  Transfer Date: 09-18-23 @ 11:14    Admission Diagnosis:   1) R 4-7 rib fx  2) age indeterminate R medial malleolus fx    Active Problems/injuries:  1) R 4-7 rib fx    Procedures:  1) Pending Rib Block    Consultants:  [ ] Cardiology  [ ] Endocrine  [ ] Infectious Disease  [ ] Medicine  [ ]Neurosurgery  [ ] Ortho       [ ] Weight Bearing Status:  [ ] Palliative       [ ] Advanced Directives:    [ ] Physical Medicine and Rehab       [ ] Disposition :   [ ] Plastics  [ ] Pulmonary    Medications  acetaminophen     Tablet .. 650 milliGRAM(s) Oral every 6 hours  enoxaparin Injectable 40 milliGRAM(s) SubCutaneous every 12 hours  gabapentin 300 milliGRAM(s) Oral every 8 hours  HYDROmorphone  Injectable 0.5 milliGRAM(s) IV Push every 4 hours PRN  influenza   Vaccine 0.5 milliLiter(s) IntraMuscular once  ketorolac   Injectable 15 milliGRAM(s) IV Push every 6 hours  lidocaine   4% Patch 3 Patch Transdermal every 24 hours  methocarbamol 750 milliGRAM(s) Oral every 12 hours  metoprolol tartrate 12.5 milliGRAM(s) Oral daily  nicotine - 21 mG/24Hr(s) Patch 1 Patch Transdermal daily  oxyCODONE    IR 5 milliGRAM(s) Oral every 4 hours PRN  oxyCODONE    IR 10 milliGRAM(s) Oral every 4 hours PRN  polyethylene glycol 3350 17 Gram(s) Oral daily  senna 2 Tablet(s) Oral at bedtime      [x] I attest I have reviewed and reconciled all medications prior to transfer    I have discussed this case with [facility] upon transfer and all questions regarding ICU course were answered.  The following items are to be followed up:    1) Neuro: Continue pain medications with multimodal pain control, currently ordered for Tylenol, Gabapentin, Dilaudid, Tordol, Roboxin. Patient is on Suboxone outpatient.  2) Pulm: Maintain SPO2 >92%. Continue IS as per PIC Protocol. Pending rib block evaluation   3)CVS: Hemodynamically stable, maintain MAP >65. Ordered Labetalol 10mg IVP for HTN. Not on HTN medications out patient,  4) GI: regular diet, continue bowel regimen as long as on opiods.   5) Endo: None  7) Heme: SCDs and Lovenox BID  8) ID: No indication for antibiotics  9) Skin: continue bacitracin for abrasions.

## 2023-09-18 NOTE — CONSULT NOTE ADULT - PROBLEM SELECTOR RECOMMENDATION 9
Minimal purulent drainage was debrided from right ear, some pus remains  Right ear was cultured, now pending  Recommend Augmentin 875 BID x10 days   Will f/u results of culture to ensure adequate antibiotic coverage     Plan of care d/w Dr. Patel
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
## 2023-09-19 LAB
HCT VFR BLD CALC: 35.7 % — LOW (ref 39–50)
HGB BLD-MCNC: 11.8 G/DL — LOW (ref 13–17)
MCHC RBC-ENTMCNC: 26.8 PG — LOW (ref 27–34)
MCHC RBC-ENTMCNC: 33.1 GM/DL — SIGNIFICANT CHANGE UP (ref 32–36)
MCV RBC AUTO: 81.1 FL — SIGNIFICANT CHANGE UP (ref 80–100)
PLATELET # BLD AUTO: 221 K/UL — SIGNIFICANT CHANGE UP (ref 150–400)
RBC # BLD: 4.4 M/UL — SIGNIFICANT CHANGE UP (ref 4.2–5.8)
RBC # FLD: 13.2 % — SIGNIFICANT CHANGE UP (ref 10.3–14.5)
WBC # BLD: 8.91 K/UL — SIGNIFICANT CHANGE UP (ref 3.8–10.5)
WBC # FLD AUTO: 8.91 K/UL — SIGNIFICANT CHANGE UP (ref 3.8–10.5)

## 2023-09-19 PROCEDURE — 71045 X-RAY EXAM CHEST 1 VIEW: CPT | Mod: 26

## 2023-09-19 PROCEDURE — 99233 SBSQ HOSP IP/OBS HIGH 50: CPT

## 2023-09-19 RX ORDER — HYDROMORPHONE HYDROCHLORIDE 2 MG/ML
0.5 INJECTION INTRAMUSCULAR; INTRAVENOUS; SUBCUTANEOUS ONCE
Refills: 0 | Status: DISCONTINUED | OUTPATIENT
Start: 2023-09-19 | End: 2023-09-19

## 2023-09-19 RX ORDER — LABETALOL HCL 100 MG
10 TABLET ORAL ONCE
Refills: 0 | Status: COMPLETED | OUTPATIENT
Start: 2023-09-19 | End: 2023-09-19

## 2023-09-19 RX ORDER — AMLODIPINE BESYLATE 2.5 MG/1
5 TABLET ORAL DAILY
Refills: 0 | Status: DISCONTINUED | OUTPATIENT
Start: 2023-09-19 | End: 2023-09-21

## 2023-09-19 RX ORDER — ACETAMINOPHEN 500 MG
975 TABLET ORAL EVERY 6 HOURS
Refills: 0 | Status: DISCONTINUED | OUTPATIENT
Start: 2023-09-19 | End: 2023-09-22

## 2023-09-19 RX ORDER — METHOCARBAMOL 500 MG/1
750 TABLET, FILM COATED ORAL EVERY 6 HOURS
Refills: 0 | Status: DISCONTINUED | OUTPATIENT
Start: 2023-09-19 | End: 2023-09-21

## 2023-09-19 RX ORDER — OXYCODONE HYDROCHLORIDE 5 MG/1
10 TABLET ORAL EVERY 4 HOURS
Refills: 0 | Status: DISCONTINUED | OUTPATIENT
Start: 2023-09-19 | End: 2023-09-22

## 2023-09-19 RX ORDER — OXYCODONE HYDROCHLORIDE 5 MG/1
15 TABLET ORAL EVERY 4 HOURS
Refills: 0 | Status: DISCONTINUED | OUTPATIENT
Start: 2023-09-19 | End: 2023-09-22

## 2023-09-19 RX ORDER — KETOROLAC TROMETHAMINE 30 MG/ML
15 SYRINGE (ML) INJECTION EVERY 6 HOURS
Refills: 0 | Status: DISCONTINUED | OUTPATIENT
Start: 2023-09-19 | End: 2023-09-21

## 2023-09-19 RX ORDER — KETOROLAC TROMETHAMINE 30 MG/ML
15 SYRINGE (ML) INJECTION ONCE
Refills: 0 | Status: DISCONTINUED | OUTPATIENT
Start: 2023-09-19 | End: 2023-09-19

## 2023-09-19 RX ORDER — IPRATROPIUM/ALBUTEROL SULFATE 18-103MCG
3 AEROSOL WITH ADAPTER (GRAM) INHALATION ONCE
Refills: 0 | Status: COMPLETED | OUTPATIENT
Start: 2023-09-19 | End: 2023-09-19

## 2023-09-19 RX ORDER — IPRATROPIUM/ALBUTEROL SULFATE 18-103MCG
3 AEROSOL WITH ADAPTER (GRAM) INHALATION EVERY 6 HOURS
Refills: 0 | Status: DISCONTINUED | OUTPATIENT
Start: 2023-09-19 | End: 2023-09-22

## 2023-09-19 RX ORDER — OXYCODONE HYDROCHLORIDE 5 MG/1
5 TABLET ORAL EVERY 4 HOURS
Refills: 0 | Status: DISCONTINUED | OUTPATIENT
Start: 2023-09-19 | End: 2023-09-22

## 2023-09-19 RX ORDER — IBUPROFEN 200 MG
600 TABLET ORAL EVERY 8 HOURS
Refills: 0 | Status: DISCONTINUED | OUTPATIENT
Start: 2023-09-19 | End: 2023-09-19

## 2023-09-19 RX ORDER — KETOROLAC TROMETHAMINE 30 MG/ML
15 SYRINGE (ML) INJECTION EVERY 6 HOURS
Refills: 0 | Status: DISCONTINUED | OUTPATIENT
Start: 2023-09-19 | End: 2023-09-19

## 2023-09-19 RX ADMIN — Medication 15 MILLIGRAM(S): at 01:59

## 2023-09-19 RX ADMIN — Medication 1 PATCH: at 12:12

## 2023-09-19 RX ADMIN — HYDROMORPHONE HYDROCHLORIDE 0.5 MILLIGRAM(S): 2 INJECTION INTRAMUSCULAR; INTRAVENOUS; SUBCUTANEOUS at 12:45

## 2023-09-19 RX ADMIN — LIDOCAINE 3 PATCH: 4 CREAM TOPICAL at 20:50

## 2023-09-19 RX ADMIN — OXYCODONE HYDROCHLORIDE 10 MILLIGRAM(S): 5 TABLET ORAL at 23:14

## 2023-09-19 RX ADMIN — Medication 1 PATCH: at 13:27

## 2023-09-19 RX ADMIN — OXYCODONE HYDROCHLORIDE 10 MILLIGRAM(S): 5 TABLET ORAL at 10:28

## 2023-09-19 RX ADMIN — HYDROMORPHONE HYDROCHLORIDE 0.5 MILLIGRAM(S): 2 INJECTION INTRAMUSCULAR; INTRAVENOUS; SUBCUTANEOUS at 02:00

## 2023-09-19 RX ADMIN — Medication 1 PATCH: at 04:36

## 2023-09-19 RX ADMIN — Medication 3 MILLILITER(S): at 01:56

## 2023-09-19 RX ADMIN — Medication 15 MILLIGRAM(S): at 02:00

## 2023-09-19 RX ADMIN — Medication 650 MILLIGRAM(S): at 05:43

## 2023-09-19 RX ADMIN — Medication 600 MILLIGRAM(S): at 22:00

## 2023-09-19 RX ADMIN — LIDOCAINE 3 PATCH: 4 CREAM TOPICAL at 00:00

## 2023-09-19 RX ADMIN — HYDROMORPHONE HYDROCHLORIDE 0.5 MILLIGRAM(S): 2 INJECTION INTRAMUSCULAR; INTRAVENOUS; SUBCUTANEOUS at 12:12

## 2023-09-19 RX ADMIN — ENOXAPARIN SODIUM 40 MILLIGRAM(S): 100 INJECTION SUBCUTANEOUS at 17:57

## 2023-09-19 RX ADMIN — Medication 975 MILLIGRAM(S): at 21:47

## 2023-09-19 RX ADMIN — HYDROMORPHONE HYDROCHLORIDE 0.5 MILLIGRAM(S): 2 INJECTION INTRAMUSCULAR; INTRAVENOUS; SUBCUTANEOUS at 20:31

## 2023-09-19 RX ADMIN — GABAPENTIN 300 MILLIGRAM(S): 400 CAPSULE ORAL at 13:59

## 2023-09-19 RX ADMIN — Medication 15 MILLIGRAM(S): at 08:33

## 2023-09-19 RX ADMIN — AMLODIPINE BESYLATE 5 MILLIGRAM(S): 2.5 TABLET ORAL at 12:13

## 2023-09-19 RX ADMIN — HYDROMORPHONE HYDROCHLORIDE 0.5 MILLIGRAM(S): 2 INJECTION INTRAMUSCULAR; INTRAVENOUS; SUBCUTANEOUS at 20:45

## 2023-09-19 RX ADMIN — LIDOCAINE 3 PATCH: 4 CREAM TOPICAL at 23:00

## 2023-09-19 RX ADMIN — HYDROMORPHONE HYDROCHLORIDE 0.5 MILLIGRAM(S): 2 INJECTION INTRAMUSCULAR; INTRAVENOUS; SUBCUTANEOUS at 01:59

## 2023-09-19 RX ADMIN — SENNA PLUS 2 TABLET(S): 8.6 TABLET ORAL at 21:48

## 2023-09-19 RX ADMIN — GABAPENTIN 300 MILLIGRAM(S): 400 CAPSULE ORAL at 05:43

## 2023-09-19 RX ADMIN — Medication 975 MILLIGRAM(S): at 17:52

## 2023-09-19 RX ADMIN — Medication 3 MILLILITER(S): at 05:11

## 2023-09-19 RX ADMIN — Medication 10 MILLIGRAM(S): at 09:10

## 2023-09-19 RX ADMIN — OXYCODONE HYDROCHLORIDE 10 MILLIGRAM(S): 5 TABLET ORAL at 17:52

## 2023-09-19 RX ADMIN — HYDROMORPHONE HYDROCHLORIDE 0.5 MILLIGRAM(S): 2 INJECTION INTRAMUSCULAR; INTRAVENOUS; SUBCUTANEOUS at 23:57

## 2023-09-19 RX ADMIN — Medication 1 APPLICATION(S): at 17:55

## 2023-09-19 RX ADMIN — METHOCARBAMOL 750 MILLIGRAM(S): 500 TABLET, FILM COATED ORAL at 13:59

## 2023-09-19 RX ADMIN — Medication 3 MILLILITER(S): at 09:02

## 2023-09-19 RX ADMIN — Medication 650 MILLIGRAM(S): at 07:40

## 2023-09-19 RX ADMIN — Medication 600 MILLIGRAM(S): at 21:48

## 2023-09-19 RX ADMIN — POLYETHYLENE GLYCOL 3350 17 GRAM(S): 17 POWDER, FOR SOLUTION ORAL at 12:13

## 2023-09-19 RX ADMIN — Medication 1 PATCH: at 08:55

## 2023-09-19 RX ADMIN — Medication 1 TABLET(S): at 17:53

## 2023-09-19 RX ADMIN — Medication 1 PATCH: at 00:00

## 2023-09-19 RX ADMIN — METHOCARBAMOL 750 MILLIGRAM(S): 500 TABLET, FILM COATED ORAL at 05:43

## 2023-09-19 RX ADMIN — Medication 600 MILLIGRAM(S): at 13:59

## 2023-09-19 RX ADMIN — LIDOCAINE 3 PATCH: 4 CREAM TOPICAL at 10:29

## 2023-09-19 RX ADMIN — Medication 650 MILLIGRAM(S): at 00:30

## 2023-09-19 RX ADMIN — Medication 650 MILLIGRAM(S): at 12:13

## 2023-09-19 RX ADMIN — Medication 1 APPLICATION(S): at 05:43

## 2023-09-19 RX ADMIN — ENOXAPARIN SODIUM 40 MILLIGRAM(S): 100 INJECTION SUBCUTANEOUS at 05:42

## 2023-09-19 RX ADMIN — GABAPENTIN 300 MILLIGRAM(S): 400 CAPSULE ORAL at 21:47

## 2023-09-19 RX ADMIN — Medication 975 MILLIGRAM(S): at 22:00

## 2023-09-19 RX ADMIN — METHOCARBAMOL 750 MILLIGRAM(S): 500 TABLET, FILM COATED ORAL at 21:48

## 2023-09-19 RX ADMIN — OXYCODONE HYDROCHLORIDE 10 MILLIGRAM(S): 5 TABLET ORAL at 11:28

## 2023-09-19 RX ADMIN — OXYCODONE HYDROCHLORIDE 10 MILLIGRAM(S): 5 TABLET ORAL at 00:00

## 2023-09-19 NOTE — CHART NOTE - NSCHARTNOTEFT_GEN_A_CORE
I went to bedside, patient improved now saturation of 95% on 4 liters. Patient states " I have been smoking a long time, about a pack a day" I explained that his saturation likely on a normal basis is lower than if he was a non-smoker, I also told him his xray is improved from yesterday and that we can help with managing his pain but he has to put the work in as well by mobilizing, using the incentive spirometer and practice coughing and deep breathing. Patient verbalized and understood what I said and was in agreement. Will continue to monitor

## 2023-09-19 NOTE — CHART NOTE - NSCHARTNOTEFT_GEN_A_CORE
RN called to inform patient started to desat into the high 80's, in no distress, I promptly went to bedside. Patient awake, alert, speaking in full sentences, no accessory muscle use stating " I got a lot of pain" I had called respiratory on the way to the bedside to start an albuterol treatment. I ordered an additional break thru dose of dilaudid and started the patient on toradol standing, chest xray pending, ATC neb treatments started. Patient respiratory stable at this time, saturation in the low 90's now. I will continue to follow
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
## 2023-09-20 LAB
-  AMIKACIN: SIGNIFICANT CHANGE UP
-  AMPICILLIN/SULBACTAM: SIGNIFICANT CHANGE UP
-  CEFEPIME: SIGNIFICANT CHANGE UP
-  CEFTAZIDIME: SIGNIFICANT CHANGE UP
-  CIPROFLOXACIN: SIGNIFICANT CHANGE UP
-  GENTAMICIN: SIGNIFICANT CHANGE UP
-  IMIPENEM: SIGNIFICANT CHANGE UP
-  IMIPENEM: SIGNIFICANT CHANGE UP
-  LEVOFLOXACIN: SIGNIFICANT CHANGE UP
-  MEROPENEM: SIGNIFICANT CHANGE UP
-  TOBRAMYCIN: SIGNIFICANT CHANGE UP
-  TRIMETHOPRIM/SULFAMETHOXAZOLE: SIGNIFICANT CHANGE UP
METHOD TYPE: SIGNIFICANT CHANGE UP
METHOD TYPE: SIGNIFICANT CHANGE UP

## 2023-09-20 PROCEDURE — 99231 SBSQ HOSP IP/OBS SF/LOW 25: CPT

## 2023-09-20 RX ORDER — HYDRALAZINE HCL 50 MG
10 TABLET ORAL ONCE
Refills: 0 | Status: COMPLETED | OUTPATIENT
Start: 2023-09-20 | End: 2023-09-20

## 2023-09-20 RX ORDER — POLYETHYLENE GLYCOL 3350 17 G/17G
17 POWDER, FOR SOLUTION ORAL
Refills: 0 | Status: DISCONTINUED | OUTPATIENT
Start: 2023-09-20 | End: 2023-09-22

## 2023-09-20 RX ORDER — LACTULOSE 10 G/15ML
20 SOLUTION ORAL DAILY
Refills: 0 | Status: DISCONTINUED | OUTPATIENT
Start: 2023-09-20 | End: 2023-09-22

## 2023-09-20 RX ORDER — HYDRALAZINE HCL 50 MG
5 TABLET ORAL ONCE
Refills: 0 | Status: COMPLETED | OUTPATIENT
Start: 2023-09-20 | End: 2023-09-20

## 2023-09-20 RX ORDER — MAGNESIUM HYDROXIDE 400 MG/1
30 TABLET, CHEWABLE ORAL DAILY
Refills: 0 | Status: DISCONTINUED | OUTPATIENT
Start: 2023-09-20 | End: 2023-09-22

## 2023-09-20 RX ORDER — ONDANSETRON 8 MG/1
4 TABLET, FILM COATED ORAL ONCE
Refills: 0 | Status: COMPLETED | OUTPATIENT
Start: 2023-09-20 | End: 2023-09-20

## 2023-09-20 RX ADMIN — OXYCODONE HYDROCHLORIDE 15 MILLIGRAM(S): 5 TABLET ORAL at 15:30

## 2023-09-20 RX ADMIN — Medication 15 MILLIGRAM(S): at 17:15

## 2023-09-20 RX ADMIN — Medication 15 MILLIGRAM(S): at 06:49

## 2023-09-20 RX ADMIN — OXYCODONE HYDROCHLORIDE 15 MILLIGRAM(S): 5 TABLET ORAL at 08:43

## 2023-09-20 RX ADMIN — OXYCODONE HYDROCHLORIDE 10 MILLIGRAM(S): 5 TABLET ORAL at 21:35

## 2023-09-20 RX ADMIN — Medication 975 MILLIGRAM(S): at 06:49

## 2023-09-20 RX ADMIN — Medication 975 MILLIGRAM(S): at 06:11

## 2023-09-20 RX ADMIN — ENOXAPARIN SODIUM 40 MILLIGRAM(S): 100 INJECTION SUBCUTANEOUS at 17:15

## 2023-09-20 RX ADMIN — Medication 10 MILLIGRAM(S): at 20:35

## 2023-09-20 RX ADMIN — AMLODIPINE BESYLATE 5 MILLIGRAM(S): 2.5 TABLET ORAL at 06:11

## 2023-09-20 RX ADMIN — METHOCARBAMOL 750 MILLIGRAM(S): 500 TABLET, FILM COATED ORAL at 22:29

## 2023-09-20 RX ADMIN — Medication 1 TABLET(S): at 06:10

## 2023-09-20 RX ADMIN — Medication 15 MILLIGRAM(S): at 06:11

## 2023-09-20 RX ADMIN — Medication 15 MILLIGRAM(S): at 23:30

## 2023-09-20 RX ADMIN — GABAPENTIN 300 MILLIGRAM(S): 400 CAPSULE ORAL at 22:30

## 2023-09-20 RX ADMIN — METHOCARBAMOL 750 MILLIGRAM(S): 500 TABLET, FILM COATED ORAL at 11:52

## 2023-09-20 RX ADMIN — Medication 1 APPLICATION(S): at 17:30

## 2023-09-20 RX ADMIN — Medication 15 MILLIGRAM(S): at 13:29

## 2023-09-20 RX ADMIN — LACTULOSE 20 GRAM(S): 10 SOLUTION ORAL at 17:16

## 2023-09-20 RX ADMIN — LIDOCAINE 3 PATCH: 4 CREAM TOPICAL at 22:06

## 2023-09-20 RX ADMIN — Medication 5 MILLIGRAM(S): at 18:40

## 2023-09-20 RX ADMIN — OXYCODONE HYDROCHLORIDE 10 MILLIGRAM(S): 5 TABLET ORAL at 00:14

## 2023-09-20 RX ADMIN — Medication 15 MILLIGRAM(S): at 11:57

## 2023-09-20 RX ADMIN — LIDOCAINE 3 PATCH: 4 CREAM TOPICAL at 19:28

## 2023-09-20 RX ADMIN — OXYCODONE HYDROCHLORIDE 15 MILLIGRAM(S): 5 TABLET ORAL at 13:48

## 2023-09-20 RX ADMIN — OXYCODONE HYDROCHLORIDE 15 MILLIGRAM(S): 5 TABLET ORAL at 10:10

## 2023-09-20 RX ADMIN — Medication 1 APPLICATION(S): at 06:10

## 2023-09-20 RX ADMIN — LIDOCAINE 3 PATCH: 4 CREAM TOPICAL at 11:49

## 2023-09-20 RX ADMIN — METHOCARBAMOL 750 MILLIGRAM(S): 500 TABLET, FILM COATED ORAL at 17:17

## 2023-09-20 RX ADMIN — Medication 15 MILLIGRAM(S): at 01:22

## 2023-09-20 RX ADMIN — Medication 975 MILLIGRAM(S): at 17:14

## 2023-09-20 RX ADMIN — Medication 15 MILLIGRAM(S): at 01:07

## 2023-09-20 RX ADMIN — Medication 15 MILLIGRAM(S): at 22:29

## 2023-09-20 RX ADMIN — Medication 975 MILLIGRAM(S): at 11:51

## 2023-09-20 RX ADMIN — GABAPENTIN 300 MILLIGRAM(S): 400 CAPSULE ORAL at 14:51

## 2023-09-20 RX ADMIN — GABAPENTIN 300 MILLIGRAM(S): 400 CAPSULE ORAL at 06:10

## 2023-09-20 RX ADMIN — Medication 1 PATCH: at 17:59

## 2023-09-20 RX ADMIN — ENOXAPARIN SODIUM 40 MILLIGRAM(S): 100 INJECTION SUBCUTANEOUS at 06:11

## 2023-09-20 RX ADMIN — MAGNESIUM HYDROXIDE 30 MILLILITER(S): 400 TABLET, CHEWABLE ORAL at 12:01

## 2023-09-20 RX ADMIN — OXYCODONE HYDROCHLORIDE 15 MILLIGRAM(S): 5 TABLET ORAL at 17:57

## 2023-09-20 RX ADMIN — HYDROMORPHONE HYDROCHLORIDE 0.5 MILLIGRAM(S): 2 INJECTION INTRAMUSCULAR; INTRAVENOUS; SUBCUTANEOUS at 01:14

## 2023-09-20 RX ADMIN — Medication 975 MILLIGRAM(S): at 23:30

## 2023-09-20 RX ADMIN — Medication 1 PATCH: at 01:31

## 2023-09-20 RX ADMIN — Medication 975 MILLIGRAM(S): at 13:50

## 2023-09-20 RX ADMIN — METHOCARBAMOL 750 MILLIGRAM(S): 500 TABLET, FILM COATED ORAL at 01:06

## 2023-09-20 RX ADMIN — OXYCODONE HYDROCHLORIDE 10 MILLIGRAM(S): 5 TABLET ORAL at 20:35

## 2023-09-20 RX ADMIN — METHOCARBAMOL 750 MILLIGRAM(S): 500 TABLET, FILM COATED ORAL at 06:10

## 2023-09-20 RX ADMIN — Medication 1 PATCH: at 11:49

## 2023-09-20 RX ADMIN — Medication 975 MILLIGRAM(S): at 22:30

## 2023-09-20 RX ADMIN — Medication 1 TABLET(S): at 17:15

## 2023-09-20 NOTE — CHART NOTE - NSCHARTNOTEFT_GEN_A_CORE
Ear culture reviewed, is as shown below. Continue Augmentin as initially recommended.       Culture - Ear, Nose and Throat (ENT) (09.18.23 @ 17:42)    Gram Stain:   Rare polymorphonuclear leukocytes per low power field  Few Gram Positive Rods per oil power field    Specimen Source: Ear Ear    Culture Results:   Moderate Acinetobacter baumannii/nosocomialis group Subjective:  Patient notes less right sided otorrhea but is complaining of persistently diminished hearing on the right.    Objective:   Otic exam continues to improve.  Ears: Right: ear canal with slightly less purulent drainage today, TM ruptured with purulence and blood. No mastoid tenderness, erythema, or ear bulging.            Left: ear canal clear, TM intact without effusion or erythema. No evidence of any fluid drainage. No mastoid tenderness, erythema, or ear bulging    Culture - Ear, Nose and Throat (ENT) (09.18.23 @ 17:42)    Gram Stain:   Rare polymorphonuclear leukocytes per low power field  Few Gram Positive Rods per oil power field    Specimen Source: Ear Ear    Culture Results:   Moderate Acinetobacter baumannii/nosocomialis group      Assessment/Plan:   Ear culture reviewed, is as shown above. Continue Augmentin as initially recommended.  Patient to f/u with ENT as an outpatient.
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
## 2023-09-21 ENCOUNTER — TRANSCRIPTION ENCOUNTER (OUTPATIENT)
Age: 43
End: 2023-09-21

## 2023-09-21 PROCEDURE — 99232 SBSQ HOSP IP/OBS MODERATE 35: CPT

## 2023-09-21 RX ORDER — AMLODIPINE BESYLATE 2.5 MG/1
10 TABLET ORAL DAILY
Refills: 0 | Status: DISCONTINUED | OUTPATIENT
Start: 2023-09-21 | End: 2023-09-22

## 2023-09-21 RX ORDER — GABAPENTIN 400 MG/1
600 CAPSULE ORAL EVERY 8 HOURS
Refills: 0 | Status: DISCONTINUED | OUTPATIENT
Start: 2023-09-21 | End: 2023-09-22

## 2023-09-21 RX ORDER — HYDRALAZINE HCL 50 MG
5 TABLET ORAL ONCE
Refills: 0 | Status: COMPLETED | OUTPATIENT
Start: 2023-09-21 | End: 2023-09-21

## 2023-09-21 RX ORDER — AMLODIPINE BESYLATE 2.5 MG/1
5 TABLET ORAL ONCE
Refills: 0 | Status: COMPLETED | OUTPATIENT
Start: 2023-09-21 | End: 2023-09-21

## 2023-09-21 RX ORDER — METHOCARBAMOL 500 MG/1
1500 TABLET, FILM COATED ORAL EVERY 8 HOURS
Refills: 0 | Status: DISCONTINUED | OUTPATIENT
Start: 2023-09-21 | End: 2023-09-22

## 2023-09-21 RX ADMIN — Medication 975 MILLIGRAM(S): at 17:39

## 2023-09-21 RX ADMIN — METHOCARBAMOL 1500 MILLIGRAM(S): 500 TABLET, FILM COATED ORAL at 21:17

## 2023-09-21 RX ADMIN — Medication 1 TABLET(S): at 17:38

## 2023-09-21 RX ADMIN — GABAPENTIN 600 MILLIGRAM(S): 400 CAPSULE ORAL at 21:18

## 2023-09-21 RX ADMIN — OXYCODONE HYDROCHLORIDE 15 MILLIGRAM(S): 5 TABLET ORAL at 15:43

## 2023-09-21 RX ADMIN — GABAPENTIN 300 MILLIGRAM(S): 400 CAPSULE ORAL at 05:01

## 2023-09-21 RX ADMIN — OXYCODONE HYDROCHLORIDE 15 MILLIGRAM(S): 5 TABLET ORAL at 09:46

## 2023-09-21 RX ADMIN — Medication 5 MILLIGRAM(S): at 16:33

## 2023-09-21 RX ADMIN — OXYCODONE HYDROCHLORIDE 15 MILLIGRAM(S): 5 TABLET ORAL at 06:00

## 2023-09-21 RX ADMIN — Medication 975 MILLIGRAM(S): at 11:21

## 2023-09-21 RX ADMIN — METHOCARBAMOL 750 MILLIGRAM(S): 500 TABLET, FILM COATED ORAL at 05:01

## 2023-09-21 RX ADMIN — AMLODIPINE BESYLATE 5 MILLIGRAM(S): 2.5 TABLET ORAL at 05:44

## 2023-09-21 RX ADMIN — ENOXAPARIN SODIUM 40 MILLIGRAM(S): 100 INJECTION SUBCUTANEOUS at 17:39

## 2023-09-21 RX ADMIN — Medication 975 MILLIGRAM(S): at 05:00

## 2023-09-21 RX ADMIN — Medication 975 MILLIGRAM(S): at 18:09

## 2023-09-21 RX ADMIN — OXYCODONE HYDROCHLORIDE 15 MILLIGRAM(S): 5 TABLET ORAL at 22:49

## 2023-09-21 RX ADMIN — Medication 975 MILLIGRAM(S): at 07:43

## 2023-09-21 RX ADMIN — AMLODIPINE BESYLATE 5 MILLIGRAM(S): 2.5 TABLET ORAL at 05:01

## 2023-09-21 RX ADMIN — LIDOCAINE 3 PATCH: 4 CREAM TOPICAL at 23:47

## 2023-09-21 RX ADMIN — Medication 975 MILLIGRAM(S): at 12:30

## 2023-09-21 RX ADMIN — OXYCODONE HYDROCHLORIDE 15 MILLIGRAM(S): 5 TABLET ORAL at 11:20

## 2023-09-21 RX ADMIN — HYDROMORPHONE HYDROCHLORIDE 0.5 MILLIGRAM(S): 2 INJECTION INTRAMUSCULAR; INTRAVENOUS; SUBCUTANEOUS at 20:19

## 2023-09-21 RX ADMIN — OXYCODONE HYDROCHLORIDE 15 MILLIGRAM(S): 5 TABLET ORAL at 05:01

## 2023-09-21 RX ADMIN — Medication 1 TABLET(S): at 05:01

## 2023-09-21 RX ADMIN — OXYCODONE HYDROCHLORIDE 15 MILLIGRAM(S): 5 TABLET ORAL at 00:16

## 2023-09-21 RX ADMIN — Medication 1 PATCH: at 11:25

## 2023-09-21 RX ADMIN — Medication 15 MILLIGRAM(S): at 06:00

## 2023-09-21 RX ADMIN — Medication 15 MILLIGRAM(S): at 05:00

## 2023-09-21 RX ADMIN — OXYCODONE HYDROCHLORIDE 15 MILLIGRAM(S): 5 TABLET ORAL at 23:49

## 2023-09-21 RX ADMIN — OXYCODONE HYDROCHLORIDE 15 MILLIGRAM(S): 5 TABLET ORAL at 01:16

## 2023-09-21 RX ADMIN — HYDROMORPHONE HYDROCHLORIDE 0.5 MILLIGRAM(S): 2 INJECTION INTRAMUSCULAR; INTRAVENOUS; SUBCUTANEOUS at 21:19

## 2023-09-21 RX ADMIN — GABAPENTIN 600 MILLIGRAM(S): 400 CAPSULE ORAL at 13:26

## 2023-09-21 RX ADMIN — LIDOCAINE 3 PATCH: 4 CREAM TOPICAL at 11:22

## 2023-09-21 RX ADMIN — OXYCODONE HYDROCHLORIDE 15 MILLIGRAM(S): 5 TABLET ORAL at 16:27

## 2023-09-21 RX ADMIN — Medication 1 APPLICATION(S): at 17:42

## 2023-09-21 RX ADMIN — Medication 1 PATCH: at 11:20

## 2023-09-21 RX ADMIN — ENOXAPARIN SODIUM 40 MILLIGRAM(S): 100 INJECTION SUBCUTANEOUS at 05:02

## 2023-09-21 RX ADMIN — Medication 975 MILLIGRAM(S): at 06:00

## 2023-09-21 RX ADMIN — METHOCARBAMOL 1500 MILLIGRAM(S): 500 TABLET, FILM COATED ORAL at 13:26

## 2023-09-21 RX ADMIN — Medication 1 APPLICATION(S): at 05:01

## 2023-09-21 NOTE — DISCHARGE NOTE NURSING/CASE MANAGEMENT/SOCIAL WORK - NSDCPEFALRISK_GEN_ALL_CORE
For information on Fall & Injury Prevention, visit: [email or web address] OR  [email or web address] OR  https://www.cdc.gov/steadi/patient.html

## 2023-09-21 NOTE — PROGRESS NOTE ADULT - ASSESSMENT
43M    < from: CT Chest w/ IV Cont (09.17.23 @ 07:19) >  BONES: Acute nondisplaced fractures right lateral fourth and fifth ribs.   Acute comminuted displaced right posterior sixth rib fracture. Acute   mildly displaced right posterior seventh rib fracture. No other fracture.    s/p SAP block with mild to mod relief  interested in repeat block today    Patient interested in a nerve block procedure as an addition to their current analgesic therapy.   The patient has not experienced satisfactory relief from other treatment modalities including analgesic pain medications.  The risks, benefits and alternatives of a nerve block were discussed with the patient.  The benefits include hopeful relief of pain.  The alternatives include avoiding the procedure and continuing treatment with non-medication therapies and medications, including increasing and/or changing current analgesic medications.  The patient understands that this is an invasive procedure and therefore there are inherent risks. The patient was informed of the risks of the procedure including but not limited to infection, bleeding, injury to nearby tissues, permanent nerve injury, stroke, no decrease in pain or worsened pain, and toxicity from local anesthetic medications.   No certain guarantees have been made and patient understands that responses can vary and repeat procedures may be necessary.

## 2023-09-21 NOTE — PHYSICAL THERAPY INITIAL EVALUATION ADULT - SIT-TO-STAND BALANCE
Aspiration Discharge Instructions    Activity:  · Go home and rest quietly for the remainder of the day.   · Avoid strenuous activities such as heavy lifting (over 10 pounds) or athletics for 24-48 hours.   · You may return to work the following day if your job does not require you to lift or perform strenuous duties; otherwise, remain off work for 48 hours.       Diet:  · You may resume your normal diet after the procedure.   · Avoid alcoholic beverages for 24 hours if you received sedation.    Personal Hygiene:  · Tomorrow you may sponge bathe or shower as usual, but avoid bathtub bathing for 5 days. The bandage strip may be removed at this time.   · You may cleanse the aspiration site with mild soap and warm water. Apply a new bandage if there is any irritation from clothing. The site will heal in the same manner as any small cut. Avoid using lotions, powders or creams to the procedure site.    Additional Information:  · Some tenderness around the site of the aspiration is a common and normal occurrence. This discomfort may be treated with a mild pain medication such as Tylenol.       Notify your doctor or call the Radiology Nursing Department if you develop any of the following symptoms:  • Increase in pain or pain unrelieved by mild pain medication such as Tylenol.  • Bleeding or drainage from the puncture site or progressive swelling or a lump that you can feel near the aspiration site.  • Temperature that remains above 101°F.  • Redness around the puncture site.    Radiology Nursing Department ([phone] - after hours, [phone] 7am-5pm, [phone] non-emergent voicemail    
normal balance

## 2023-09-21 NOTE — DISCHARGE NOTE NURSING/CASE MANAGEMENT/SOCIAL WORK - NSDCVIVACCINE_GEN_ALL_CORE_FT
Td (adult) preservative free; 17-Sep-2023 07:47; Sarah San (RN); [employer]; [id number] (Exp. Date: 03-Oct-2025); IntraMuscular; Deltoid Left.; 0.5 milliLiter(s); VIS (VIS Published: 01-Sep-2023, VIS Presented: 17-Sep-2023);
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
## 2023-09-21 NOTE — DISCHARGE NOTE NURSING/CASE MANAGEMENT/SOCIAL WORK - NSDCFUADDAPPT_GEN_ALL_CORE_FT
Your next appointment at [facility] in [place] is on Wednesday September 27th at 5:30pm.
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
## 2023-09-21 NOTE — DISCHARGE NOTE NURSING/CASE MANAGEMENT/SOCIAL WORK - PATIENT PORTAL LINK FT
You can access the FollowMyHealth Patient Portal offered by [facility] by registering at the following website: [email or web address]. By joining [facility]’s FollowMyHealth portal, you will also be able to view your health information using other applications (apps) compatible with our system.

## 2023-09-22 ENCOUNTER — TRANSCRIPTION ENCOUNTER (OUTPATIENT)
Age: 43
End: 2023-09-22

## 2023-09-22 VITALS
OXYGEN SATURATION: 96 % | DIASTOLIC BLOOD PRESSURE: 81 MMHG | RESPIRATION RATE: 18 BRPM | SYSTOLIC BLOOD PRESSURE: 154 MMHG | HEART RATE: 90 BPM | TEMPERATURE: 98 F

## 2023-09-22 PROCEDURE — 87205 SMEAR GRAM STAIN: CPT

## 2023-09-22 PROCEDURE — 73090 X-RAY EXAM OF FOREARM: CPT

## 2023-09-22 PROCEDURE — 87070 CULTURE OTHR SPECIMN AEROBIC: CPT

## 2023-09-22 PROCEDURE — 84100 ASSAY OF PHOSPHORUS: CPT

## 2023-09-22 PROCEDURE — 72125 CT NECK SPINE W/O DYE: CPT | Mod: MA

## 2023-09-22 PROCEDURE — 99232 SBSQ HOSP IP/OBS MODERATE 35: CPT

## 2023-09-22 PROCEDURE — 73070 X-RAY EXAM OF ELBOW: CPT

## 2023-09-22 PROCEDURE — 87640 STAPH A DNA AMP PROBE: CPT

## 2023-09-22 PROCEDURE — 87186 SC STD MICRODIL/AGAR DIL: CPT

## 2023-09-22 PROCEDURE — 73600 X-RAY EXAM OF ANKLE: CPT

## 2023-09-22 PROCEDURE — 90714 TD VACC NO PRESV 7 YRS+ IM: CPT

## 2023-09-22 PROCEDURE — 86850 RBC ANTIBODY SCREEN: CPT

## 2023-09-22 PROCEDURE — 85610 PROTHROMBIN TIME: CPT

## 2023-09-22 PROCEDURE — 80053 COMPREHEN METABOLIC PANEL: CPT

## 2023-09-22 PROCEDURE — 85027 COMPLETE CBC AUTOMATED: CPT

## 2023-09-22 PROCEDURE — 80048 BASIC METABOLIC PNL TOTAL CA: CPT

## 2023-09-22 PROCEDURE — 93005 ELECTROCARDIOGRAM TRACING: CPT

## 2023-09-22 PROCEDURE — 71260 CT THORAX DX C+: CPT | Mod: MA

## 2023-09-22 PROCEDURE — 83605 ASSAY OF LACTIC ACID: CPT

## 2023-09-22 PROCEDURE — 86900 BLOOD TYPING SEROLOGIC ABO: CPT

## 2023-09-22 PROCEDURE — 83735 ASSAY OF MAGNESIUM: CPT

## 2023-09-22 PROCEDURE — 85025 COMPLETE CBC W/AUTO DIFF WBC: CPT

## 2023-09-22 PROCEDURE — 87075 CULTR BACTERIA EXCEPT BLOOD: CPT

## 2023-09-22 PROCEDURE — 70496 CT ANGIOGRAPHY HEAD: CPT | Mod: MA

## 2023-09-22 PROCEDURE — 87077 CULTURE AEROBIC IDENTIFY: CPT

## 2023-09-22 PROCEDURE — 73620 X-RAY EXAM OF FOOT: CPT

## 2023-09-22 PROCEDURE — 83690 ASSAY OF LIPASE: CPT

## 2023-09-22 PROCEDURE — 94640 AIRWAY INHALATION TREATMENT: CPT

## 2023-09-22 PROCEDURE — 36415 COLL VENOUS BLD VENIPUNCTURE: CPT

## 2023-09-22 PROCEDURE — 87184 SC STD DISK METHOD PER PLATE: CPT

## 2023-09-22 PROCEDURE — 80307 DRUG TEST PRSMV CHEM ANLYZR: CPT

## 2023-09-22 PROCEDURE — 71045 X-RAY EXAM CHEST 1 VIEW: CPT

## 2023-09-22 PROCEDURE — 70498 CT ANGIOGRAPHY NECK: CPT | Mod: MA

## 2023-09-22 PROCEDURE — 86901 BLOOD TYPING SEROLOGIC RH(D): CPT

## 2023-09-22 PROCEDURE — 87641 MR-STAPH DNA AMP PROBE: CPT

## 2023-09-22 PROCEDURE — 97163 PT EVAL HIGH COMPLEX 45 MIN: CPT

## 2023-09-22 PROCEDURE — 70450 CT HEAD/BRAIN W/O DYE: CPT | Mod: MA

## 2023-09-22 PROCEDURE — 73130 X-RAY EXAM OF HAND: CPT

## 2023-09-22 PROCEDURE — 85730 THROMBOPLASTIN TIME PARTIAL: CPT

## 2023-09-22 PROCEDURE — 73560 X-RAY EXAM OF KNEE 1 OR 2: CPT

## 2023-09-22 PROCEDURE — 74177 CT ABD & PELVIS W/CONTRAST: CPT | Mod: MA

## 2023-09-22 RX ORDER — SENNA PLUS 8.6 MG/1
2 TABLET ORAL
Qty: 0 | Refills: 0 | DISCHARGE
Start: 2023-09-22

## 2023-09-22 RX ORDER — NALOXONE HYDROCHLORIDE 4 MG/.1ML
4 SPRAY NASAL
Qty: 1 | Refills: 0
Start: 2023-09-22

## 2023-09-22 RX ORDER — AMLODIPINE BESYLATE 2.5 MG/1
1 TABLET ORAL
Qty: 30 | Refills: 0
Start: 2023-09-22 | End: 2023-10-21

## 2023-09-22 RX ORDER — IBUPROFEN 200 MG
600 TABLET ORAL EVERY 8 HOURS
Refills: 0 | Status: DISCONTINUED | OUTPATIENT
Start: 2023-09-22 | End: 2023-09-22

## 2023-09-22 RX ORDER — OXYCODONE HYDROCHLORIDE 5 MG/1
3 TABLET ORAL
Qty: 40 | Refills: 0
Start: 2023-09-22

## 2023-09-22 RX ORDER — BACITRACIN ZINC 500 UNIT/G
1 OINTMENT IN PACKET (EA) TOPICAL
Qty: 0 | Refills: 0 | DISCHARGE
Start: 2023-09-22

## 2023-09-22 RX ORDER — LIDOCAINE 4 G/100G
1 CREAM TOPICAL
Qty: 4 | Refills: 0
Start: 2023-09-22

## 2023-09-22 RX ORDER — GABAPENTIN 400 MG/1
1 CAPSULE ORAL
Qty: 15 | Refills: 0
Start: 2023-09-22 | End: 2023-09-26

## 2023-09-22 RX ORDER — IBUPROFEN 200 MG
1 TABLET ORAL
Qty: 30 | Refills: 0
Start: 2023-09-22 | End: 2023-10-01

## 2023-09-22 RX ORDER — METHOCARBAMOL 500 MG/1
2 TABLET, FILM COATED ORAL
Qty: 18 | Refills: 0
Start: 2023-09-22 | End: 2023-09-24

## 2023-09-22 RX ADMIN — OXYCODONE HYDROCHLORIDE 15 MILLIGRAM(S): 5 TABLET ORAL at 07:45

## 2023-09-22 RX ADMIN — Medication 600 MILLIGRAM(S): at 13:01

## 2023-09-22 RX ADMIN — GABAPENTIN 600 MILLIGRAM(S): 400 CAPSULE ORAL at 13:01

## 2023-09-22 RX ADMIN — Medication 975 MILLIGRAM(S): at 06:43

## 2023-09-22 RX ADMIN — METHOCARBAMOL 1500 MILLIGRAM(S): 500 TABLET, FILM COATED ORAL at 06:43

## 2023-09-22 RX ADMIN — LIDOCAINE 3 PATCH: 4 CREAM TOPICAL at 11:27

## 2023-09-22 RX ADMIN — Medication 975 MILLIGRAM(S): at 11:29

## 2023-09-22 RX ADMIN — Medication 975 MILLIGRAM(S): at 12:55

## 2023-09-22 RX ADMIN — Medication 1 PATCH: at 11:11

## 2023-09-22 RX ADMIN — Medication 1 PATCH: at 11:28

## 2023-09-22 RX ADMIN — Medication 975 MILLIGRAM(S): at 01:10

## 2023-09-22 RX ADMIN — METHOCARBAMOL 1500 MILLIGRAM(S): 500 TABLET, FILM COATED ORAL at 13:02

## 2023-09-22 RX ADMIN — OXYCODONE HYDROCHLORIDE 15 MILLIGRAM(S): 5 TABLET ORAL at 08:47

## 2023-09-22 RX ADMIN — Medication 1 PATCH: at 08:19

## 2023-09-22 RX ADMIN — Medication 975 MILLIGRAM(S): at 00:10

## 2023-09-22 RX ADMIN — OXYCODONE HYDROCHLORIDE 15 MILLIGRAM(S): 5 TABLET ORAL at 03:59

## 2023-09-22 RX ADMIN — Medication 1 APPLICATION(S): at 06:44

## 2023-09-22 RX ADMIN — OXYCODONE HYDROCHLORIDE 15 MILLIGRAM(S): 5 TABLET ORAL at 02:59

## 2023-09-22 RX ADMIN — ENOXAPARIN SODIUM 40 MILLIGRAM(S): 100 INJECTION SUBCUTANEOUS at 06:45

## 2023-09-22 RX ADMIN — Medication 1 TABLET(S): at 06:46

## 2023-09-22 RX ADMIN — GABAPENTIN 600 MILLIGRAM(S): 400 CAPSULE ORAL at 06:43

## 2023-09-22 RX ADMIN — AMLODIPINE BESYLATE 10 MILLIGRAM(S): 2.5 TABLET ORAL at 06:44

## 2023-09-22 NOTE — PROGRESS NOTE ADULT - PROBLEM SELECTOR PLAN 1
Minimal purulent drainage was debrided from right ear, some pus remains  Right ear was cultured, gram + rods  Recommend Augmentin 875 BID x10 days   Will f/u final results of culture to ensure adequate antibiotic coverage
Right ear was cultured, gram + rods  Recommend Augmentin 875 BID x10 days   Pt to follow up with ENT (ENT and allergy [facility]) upon dc

## 2023-09-22 NOTE — DIETITIAN INITIAL EVALUATION ADULT - NUTRITIONGOAL OUTCOME1
pt will achieve desirable, gradual wt loss 1-2lb/week to optimize nutrient needs Maintain adequate PO intake to promote healing
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
## 2023-09-22 NOTE — DISCHARGE NOTE PROVIDER - NSDCCPCAREPLAN_GEN_ALL_CORE_FT
PRINCIPAL DISCHARGE DIAGNOSIS  Diagnosis: Closed fracture of multiple ribs of right side, initial encounter  Assessment and Plan of Treatment: Follow up: Please call and make an appointment  with your primary care physician for follow up as necessary.   Activity: May return to normal activities as tolerated, however refrain from heavy lifting > 10-15 lbs for 2 weeks.  Diet: May continue regular diet.  Medications: Please take all medications listed on your discharge paperwork as prescribed. Pain medication has been prescribed for you. Please, take it as it has been prescribed, do not drive or operate heavy machinery while taking narcotics.    Patient is advised to RETURN TO THE EMERGENCY DEPARTMENT for any of the following - worsening pain, fever/chills, nausea/vomiting, altered mental status, chest pain, shortness of breath, or any other new / worsening symptom.      SECONDARY DISCHARGE DIAGNOSES  Diagnosis: Traumatic diaphragmatic hernia  Assessment and Plan of Treatment:     Diagnosis: Rib fracture  Assessment and Plan of Treatment:      PRINCIPAL DISCHARGE DIAGNOSIS  Diagnosis: Closed fracture of multiple ribs of right side, initial encounter  Assessment and Plan of Treatment: Follow up: Please call and make an appointment  with your primary care physician for follow up as necessary.   Activity: May return to normal activities as tolerated, however refrain from heavy lifting > 10-15 lbs for 2 weeks.  Diet: May continue regular diet.  Medications: Please take all medications listed on your discharge paperwork as prescribed. Pain medication has been prescribed for you. Please, take it as it has been prescribed, do not drive or operate heavy machinery while taking narcotics.    Patient is advised to RETURN TO THE EMERGENCY DEPARTMENT for any of the following - worsening pain, fever/chills, nausea/vomiting, altered mental status, chest pain, shortness of breath, or any other new / worsening symptom.      SECONDARY DISCHARGE DIAGNOSES  Diagnosis: Rib fracture  Assessment and Plan of Treatment:     Diagnosis: Traumatic diaphragmatic hernia  Assessment and Plan of Treatment:     Diagnosis: HTN, goal below 130/80  Assessment and Plan of Treatment:      PRINCIPAL DISCHARGE DIAGNOSIS  Diagnosis: Closed fracture of multiple ribs of right side, initial encounter  Assessment and Plan of Treatment: Follow up: Please call and make an appointment  with your primary care physician for follow up as necessary.   Activity: May return to normal activities as tolerated, however refrain from heavy lifting > 10-15 lbs for 2 weeks.  Diet: May continue regular diet.  Medications: Please take all medications listed on your discharge paperwork as prescribed. Pain medication has been prescribed for you. Please, take it as it has been prescribed, do not drive or operate heavy machinery while taking narcotics.    Patient is advised to RETURN TO THE EMERGENCY DEPARTMENT for any of the following - worsening pain, fever/chills, nausea/vomiting, altered mental status, chest pain, shortness of breath, or any other new / worsening symptom.      SECONDARY DISCHARGE DIAGNOSES  Diagnosis: HTN, goal below 130/80  Assessment and Plan of Treatment: Please take medication as prescribed.  It is necessary for you to follow up with your Primary Care Doctor to monitor your blood pressure.    Diagnosis: Rib fracture  Assessment and Plan of Treatment:     Diagnosis: Traumatic diaphragmatic hernia  Assessment and Plan of Treatment:      PRINCIPAL DISCHARGE DIAGNOSIS  Diagnosis: Closed fracture of multiple ribs of right side, initial encounter  Assessment and Plan of Treatment: Follow up: Please call and make an appointment  with your primary care physician for follow up as necessary.   Activity: May return to normal activities as tolerated, however refrain from heavy lifting > 10-15 lbs for 2 weeks.  Diet: May continue regular diet.  Medications: Please take all medications listed on your discharge paperwork as prescribed. Pain medication has been prescribed for you. Please, take it as it has been prescribed, do not drive or operate heavy machinery while taking narcotics.  Do not take Suboxone while taking narcotics.  Resume when your pain no longer requires the use of narcotic medication.  Patient is advised to RETURN TO THE EMERGENCY DEPARTMENT for any of the following - worsening pain, fever/chills, nausea/vomiting, altered mental status, chest pain, shortness of breath, or any other new / worsening symptom.      SECONDARY DISCHARGE DIAGNOSES  Diagnosis: Traumatic diaphragmatic hernia  Assessment and Plan of Treatment:     Diagnosis: Rib fracture  Assessment and Plan of Treatment:     Diagnosis: HTN, goal below 130/80  Assessment and Plan of Treatment: Please take medication as prescribed.  It is necessary for you to follow up with your Primary Care Doctor to monitor your blood pressure.
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
## 2023-09-22 NOTE — DISCHARGE NOTE PROVIDER - DETAILS OF MALNUTRITION DIAGNOSIS/DIAGNOSES
This patient has been assessed with a concern for Malnutrition and was treated during this hospitalization for the following Nutrition diagnosis/diagnoses:     -  09/22/2023: Morbid obesity (BMI > 40)

## 2023-09-22 NOTE — DISCHARGE NOTE PROVIDER - NSFOLLOWUPCLINICS_GEN_ALL_ED_FT
[facility] Acute Care Surgery  Acute Care Surgery  [address] [place] [id number]  Phone: [phone]  Fax:
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
## 2023-09-22 NOTE — DISCHARGE NOTE PROVIDER - NSDCMRMEDTOKEN_GEN_ALL_CORE_FT
acetaminophen 325 mg oral tablet: 3 tab(s) orally every 6 hours as needed for mild to moderate pain  amoxicillin-clavulanate 875 mg-125 mg oral tablet: 1 tab(s) orally every 12 hours  buprenorphine-naloxone 12 mg-3 mg sublingual film: 1 sublingually 2 times a day  naloxone 4 mg/0.1 mL nasal spray: 4 milligram(s) intranasally once a day if patient is unconscious, call 911, repeat if needed  oxyCODONE 10 mg oral tablet: 1 tab(s) orally every 6 hours as needed for severe pain MDD: 4   acetaminophen 325 mg oral tablet: 3 tab(s) orally every 6 hours as needed for mild to moderate pain  amoxicillin-clavulanate 875 mg-125 mg oral tablet: 1 tab(s) orally every 12 hours  bacitracin 500 units/g topical ointment: 1 Apply topically to affected area 2 times a day  buprenorphine-naloxone 12 mg-3 mg sublingual film: 1 sublingually 2 times a day  gabapentin 300 mg oral capsule: 1 cap(s) orally 3 times a day  ibuprofen 600 mg oral tablet: 1 tab(s) orally every 8 hours as needed for moderate pain  lidocaine 4% topical film: Apply topically to affected area once a day 12 hours on 12 hours off  Norvasc 10 mg oral tablet: 1 tab(s) orally once a day  oxyCODONE 10 mg oral tablet: 1 tab(s) orally every 6 hours as needed for severe pain MDD: 4  senna leaf extract oral tablet: 2 tab(s) orally once a day (at bedtime)   acetaminophen 325 mg oral tablet: 3 tab(s) orally every 6 hours as needed for mild to moderate pain  amoxicillin-clavulanate 875 mg-125 mg oral tablet: 1 tab(s) orally every 12 hours  bacitracin 500 units/g topical ointment: 1 Apply topically to affected area 2 times a day  buprenorphine-naloxone 12 mg-3 mg sublingual film: 1 sublingually 2 times a day  gabapentin 300 mg oral capsule: 1 cap(s) orally 3 times a day  ibuprofen 600 mg oral tablet: 1 tab(s) orally every 8 hours as needed for moderate pain  lidocaine 4% topical film: Apply topically to affected area once a day 12 hours on 12 hours off  methocarbamol 750 mg oral tablet: 2 tab(s) orally every 8 hours  naloxone 4 mg/0.1 mL nasal spray: 4 milligram(s) intranasally once as needed for overdose  Norvasc 10 mg oral tablet: 1 tab(s) orally once a day  oxyCODONE 5 mg oral tablet: 3 tab(s) orally every 6 hours as needed for  severe pain MDD: 12 tabs  senna leaf extract oral tablet: 2 tab(s) orally once a day (at bedtime)
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
## 2023-09-22 NOTE — DIETITIAN INITIAL EVALUATION ADULT - ORAL INTAKE PTA/DIET HISTORY
Pt presents good po now and PTA. UBW 280lb. Bed scale weight 147lb, pt insists inaccuracy 2/2 no po/weight changes. Constipation improving. Pt reports having good po now and PTA. UBW 280lb. Bed scale weight 147lb, pt insists inaccuracy 2/2 no po/weight changes. Constipation improving.

## 2023-09-22 NOTE — DISCHARGE NOTE PROVIDER - ATTENDING DISCHARGE PHYSICAL EXAMINATION:
Afebrile. Hemodynamically Stable.   AOx3. Resting comfortably.   No respiratory distress.   Abdomen s/nt/nd.   Extremities warm to touch. Intact distal pulses.

## 2023-09-22 NOTE — DIETITIAN INITIAL EVALUATION ADULT - ETIOLOGY
related to input exceeds output in setting of diagrammatic hernia, rib fratcure, AOM related to increased physiologic demand for nutrient/healing

## 2023-09-22 NOTE — DIETITIAN INITIAL EVALUATION ADULT - PERTINENT MEDS FT
MEDICATIONS  (STANDING):  acetaminophen     Tablet .. 975 milliGRAM(s) Oral every 6 hours  albuterol/ipratropium for Nebulization 3 milliLiter(s) Nebulizer every 6 hours  amLODIPine   Tablet 10 milliGRAM(s) Oral daily  amoxicillin  875 milliGRAM(s)/clavulanate 1 Tablet(s) Oral every 12 hours  bacitracin   Ointment 1 Application(s) Topical two times a day  enoxaparin Injectable 40 milliGRAM(s) SubCutaneous every 12 hours  gabapentin 600 milliGRAM(s) Oral every 8 hours  ibuprofen  Tablet. 600 milliGRAM(s) Oral every 8 hours  influenza   Vaccine 0.5 milliLiter(s) IntraMuscular once  lactulose Syrup 20 Gram(s) Oral daily  lidocaine   4% Patch 3 Patch Transdermal every 24 hours  magnesium hydroxide Suspension 30 milliLiter(s) Oral daily  methocarbamol 1500 milliGRAM(s) Oral every 8 hours  nicotine - 21 mG/24Hr(s) Patch 1 Patch Transdermal daily  polyethylene glycol 3350 17 Gram(s) Oral two times a day  senna 2 Tablet(s) Oral at bedtime    MEDICATIONS  (PRN):  bisacodyl Suppository 10 milliGRAM(s) Rectal daily PRN Constipation  oxyCODONE    IR 15 milliGRAM(s) Oral every 4 hours PRN Severe Pain (7 - 10)  oxyCODONE    IR 5 milliGRAM(s) Oral every 4 hours PRN Mild Pain (1 - 3)  oxyCODONE    IR 10 milliGRAM(s) Oral every 4 hours PRN Moderate Pain (4 - 6)
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
## 2023-09-23 LAB
CULTURE RESULTS: SIGNIFICANT CHANGE UP
ORGANISM # SPEC MICROSCOPIC CNT: SIGNIFICANT CHANGE UP
SPECIMEN SOURCE: SIGNIFICANT CHANGE UP

## 2024-04-24 NOTE — PATIENT PROFILE ADULT - TOBACCO CESSATION EDUCATION/COUNSELLING(PROVIDED IF TOBACCO USED IN THE PAST 30 DAYS) NON CORE MEASURE SITES
Patient is requesting for medical marijuana card. Ok to sign her up? Last OV 12/12/23 and next OV 5/24/24. Or wait till her ov on 5/24/24\?    Offered and patient declined

## 2024-05-29 NOTE — PHYSICAL THERAPY INITIAL EVALUATION ADULT - ASSISTIVE DEVICE FOR TRANSFER: GAIT, REHAB EVAL
Thoracic Surgery Operative Report    DATE OF OPERATION:    5/28/2024    PREOPERATIVE DIAGNOSIS:    Recurrent incarcerated, obstructed paraesophageal hernia    POSTOPERATIVE DIAGNOSIS:   Same    PROCEDURE PERFORMED:   Robotic paraesophageal hernia repair  Gastropexy with gastrostomy tube  Upper endoscopy    SURGEON:      Chad Huang M.D.    ASSISTANT:      Scotty Rose MD    ANESTHESIOLOGIST:    Clarke Caceres M.D.    ANESTHESIA:     General endotracheal anesthesia.    INDICATIONS:    Mr. Peña Guerrero is a 51 y.o. male who presents with a recurrent paraesophageal hernia with stomach volvulized in his chest causing obstruction. He is taken to the operating room for repair.    The complexity of the surgical procedure necessitated additional skilled operative assistance from another surgeon. The assistant was present for a substantial portion of the operation and provided assistance to the operating physician throughout the critical aspects of the procedure. The surgical assistant performed the following: provided assistance with optimal surgical exposure of the operative field, provided high complexity, subspecialty decision making input, and operated the camera for the laparoscopic portion of the procedure.    FINDINGS:   The stomach was volvulized up in the chest with the entire fundus and body in the chest.  The hiatus had to be slightly opened in order to reduce the stomach.  Once the stomach was reduced.  There were obvious adhesions to the right tamara, pericardium and left lung.  It was difficult to tell where the esophagus was and where the stomach was over the right tamara was.  Due to this I elected to leave the stomach on the right tamara.  I pexied the stomach to the abdominal wall with a strata fix encircling a gastrostomy tube to the Manoj the tube in place . The esophagus appeared pale is the distal half. There was no stricture or obvious injury to the esophagus    WOUND  CLASSIFICATION:    Class I, Clean.    SPECIMEN:       None.    ESTIMATED BLOOD LOSS:    20 mL.    DESCRIPTION OF PROCEDURE:    The patient was brought the operating room placed in the operating table in the supine position.  General endotracheal anesthesia was begun.  The abdomen was prepped and draped in the usual sterile fashion.  A final timeout was performed and find the correct patient and procedure to be performed.  DVT prophylaxis and antibiotic prophylaxis was appropriate.    A stab incision was made just the right of midline above the umbilicus and a Veress needle was inserted.  The abdomen was insufflated to 15 mmHg.  A 5 mm port was placed.  On inspection there was no injury from the Veress needle or trocar placement however there were omental adhesions to the anterior abdominal wall.  These were taken down bluntly with the addition of a left lower quadrant 5 mm port and a left lateral quadrant robotic port.  Once the adhesions were taken down 2 additional robotic trocars were placed in the right abdomen and the supraumbilical 5 mm port was replaced with a robotic port.  A William retractor was placed in the subxiphoid position to retract the liver out of the way.  The patient was then placed in steep reverse nonlabored position and the robot was then docked.    The stomach was herniated into the chest with all of the fundus and body in the chest and some of the antrum.  After retraction I can only reduce a small amount.  I thus made an incision in the left lateral aspect of the tamara to open up the hiatus and was then able to reduce the stomach into the abdomen.  There were dense adhesions to the anterior tamara into the right tamara.  I tried dissecting out the right tamara but was difficult to tell where the right tamara began and where the right-sided wrap of the stomach began to where the esophagus was.  I dissected as much I could safely but was unable to completely dissect this out.  I thus elected to  pexied the stomach to the abdominal wall I started by using a running 0 PDS strata fix after about an inch of tacking the stomach to the abdominal wall I have made a pursestring in the stomach and created an enterotomy in the middle the pursestring.  I then made a stab incision on the abdominal wall just above this and inserted a 20 [place] G-tube through the abdominal wall into the gastrostomy.  The pursestring was then tied down.  A second STRATAFIX suture was used to complete the Manoj procedure for the G-tube and gastropexy by completely encircling the G-tube.  I then placed a 19 [place] drain in the left lateral port just anterior to the hiatus.    During the case I performed an upper endoscopy.  The esophagus appeared normal in diameter and there was no noticeable stricture.  The mucosa was pale and white. The stomach was dilated and did not appear to have full-thickness necrosis anywhere on the inside.  There is no injury to the esophagus.  There is no injury to the stomach.  The G-tube was confirmed in the body of the stomach using endoscopy.      Chad Huang MD  Thoracic & General Surgeon  [facility]  [phone]       no device

## 2025-06-02 NOTE — DISCHARGE NOTE NURSING/CASE MANAGEMENT/SOCIAL WORK - FLU SEASON?
Left ribs- Limited for rib fracture due to lack of oblique images and overlapping densities. No gross fracture is seen though fracture cannot be excluded on these images.     Small left pleural effusion and overlying opacity, atelectasis versus pneumonia. Hemothorax is not excluded.     This would be better evaluated with CT if there is continued clinical concern.    Daughter was communicated this with. Daughter was told I recommend going to ED for further evaluation. Daughter voiced agreement with plan  
Yes...

## (undated) DEVICE — PACK MINOR WITH LAP

## (undated) DEVICE — DRSG STERISTRIPS 0.5 X 4"

## (undated) DEVICE — GLV 8 PROTEXIS (CREAM) NEU-THERA

## (undated) DEVICE — SOL IRR POUR H2O 500ML

## (undated) DEVICE — DRAPE 3/4 SHEET 52X76"

## (undated) DEVICE — URETERAL CATH RED RUBBER 16FR (ORANGE)

## (undated) DEVICE — PREP BETADINE KIT

## (undated) DEVICE — ELCTR OLSEN TIP

## (undated) DEVICE — DRSG STERISTRIPS 0.25 X 3"

## (undated) DEVICE — ELCTR BOVIE TIP BLADE INSULATED 2.8" EDGE WITH SAFETY

## (undated) DEVICE — SUSPENSORY WITH LEG STRAPS XL

## (undated) DEVICE — LONE STAR RETRACTOR RING 32.5CM X 18.3CM DISP

## (undated) DEVICE — BLADE SURGICAL #15 CARBON

## (undated) DEVICE — POSITIONER STRAP ARMBOARD VELCRO TS-30

## (undated) DEVICE — DRAPE MINOR PROCEDURE

## (undated) DEVICE — DRAIN JACKSON PRATT 7FR ROUND END NO TROCAR

## (undated) DEVICE — DRAIN PENROSE .5" X 18" LATEX

## (undated) DEVICE — APPLICATOR Q TIP 6" WOOD STEM

## (undated) DEVICE — DRAIN PENROSE 0.5X12" SILICONE

## (undated) DEVICE — GOWN LG

## (undated) DEVICE — ELCTR GROUNDING PAD INFANT COVIDIEN

## (undated) DEVICE — VENODYNE/SCD SLEEVE CALF MEDIUM